# Patient Record
Sex: FEMALE | Race: WHITE | Employment: UNEMPLOYED | URBAN - METROPOLITAN AREA
[De-identification: names, ages, dates, MRNs, and addresses within clinical notes are randomized per-mention and may not be internally consistent; named-entity substitution may affect disease eponyms.]

---

## 2021-10-15 ENCOUNTER — HOSPITAL ENCOUNTER (INPATIENT)
Facility: HOSPITAL | Age: 61
LOS: 2 days | Discharge: HOME | DRG: 637 | End: 2021-10-18
Attending: EMERGENCY MEDICINE | Admitting: HOSPITALIST

## 2021-10-15 DIAGNOSIS — R73.9 HYPERGLYCEMIA: Primary | ICD-10-CM

## 2021-10-15 DIAGNOSIS — E11.10 DIABETIC KETOACIDOSIS WITHOUT COMA ASSOCIATED WITH TYPE 2 DIABETES MELLITUS (HCC): ICD-10-CM

## 2021-10-15 DIAGNOSIS — J18.9 COMMUNITY ACQUIRED PNEUMONIA OF RIGHT UPPER LOBE OF LUNG: ICD-10-CM

## 2021-10-16 ENCOUNTER — APPOINTMENT (OUTPATIENT)
Dept: GENERAL RADIOLOGY | Facility: HOSPITAL | Age: 61
DRG: 637 | End: 2021-10-16
Attending: EMERGENCY MEDICINE

## 2021-10-16 PROBLEM — R73.9 HYPERGLYCEMIA: Status: ACTIVE | Noted: 2021-10-16

## 2021-10-16 PROCEDURE — 99222 1ST HOSP IP/OBS MODERATE 55: CPT | Performed by: INTERNAL MEDICINE

## 2021-10-16 PROCEDURE — 71045 X-RAY EXAM CHEST 1 VIEW: CPT | Performed by: EMERGENCY MEDICINE

## 2021-10-16 PROCEDURE — 99223 1ST HOSP IP/OBS HIGH 75: CPT | Performed by: HOSPITALIST

## 2021-10-16 RX ORDER — ACETAMINOPHEN 325 MG/1
650 TABLET ORAL EVERY 6 HOURS PRN
Status: DISCONTINUED | OUTPATIENT
Start: 2021-10-16 | End: 2021-10-18

## 2021-10-16 RX ORDER — DEXTROSE MONOHYDRATE 25 G/50ML
50 INJECTION, SOLUTION INTRAVENOUS
Status: DISCONTINUED | OUTPATIENT
Start: 2021-10-16 | End: 2021-10-18

## 2021-10-16 RX ORDER — ONDANSETRON 2 MG/ML
4 INJECTION INTRAMUSCULAR; INTRAVENOUS EVERY 6 HOURS PRN
Status: DISCONTINUED | OUTPATIENT
Start: 2021-10-16 | End: 2021-10-18

## 2021-10-16 RX ORDER — DEXTROSE AND SODIUM CHLORIDE 5; .45 G/100ML; G/100ML
INJECTION, SOLUTION INTRAVENOUS CONTINUOUS
Status: DISCONTINUED | OUTPATIENT
Start: 2021-10-16 | End: 2021-10-16

## 2021-10-16 RX ORDER — HEPARIN SODIUM 5000 [USP'U]/ML
5000 INJECTION, SOLUTION INTRAVENOUS; SUBCUTANEOUS EVERY 12 HOURS SCHEDULED
Status: DISCONTINUED | OUTPATIENT
Start: 2021-10-16 | End: 2021-10-18

## 2021-10-16 RX ORDER — ZOLPIDEM TARTRATE 5 MG/1
5 TABLET ORAL NIGHTLY PRN
Status: DISCONTINUED | OUTPATIENT
Start: 2021-10-16 | End: 2021-10-18

## 2021-10-16 RX ORDER — HYDRALAZINE HYDROCHLORIDE 20 MG/ML
10 INJECTION INTRAMUSCULAR; INTRAVENOUS EVERY 4 HOURS PRN
Status: DISCONTINUED | OUTPATIENT
Start: 2021-10-16 | End: 2021-10-18

## 2021-10-16 RX ORDER — PANTOPRAZOLE SODIUM 40 MG/1
40 TABLET, DELAYED RELEASE ORAL
Status: DISCONTINUED | OUTPATIENT
Start: 2021-10-16 | End: 2021-10-18

## 2021-10-16 NOTE — PROGRESS NOTES
Patient is a post midnight follow up    Diabetic ketoacidosis  Off insulin drip  Can transfer to floor   Blood sugars stable  On ADA diet      Febrile illness  X-ray suggestive of pneumonia, blood cultures drawn.   Patient started on Rocephin and Zithromax

## 2021-10-16 NOTE — DIABETES ED
[facility]    Diabetes Education  Note    Ilya Smith Patient Status:  Inpatient   1960 MRN [id number]  Location [facility] 2W/SW Attending Pietro Zapien MD  Hosp Day # 0 PCP None Pcp      Labs:    HgbA1C (%) provided.       Recommendations:  Patient to self-administer all insulin doses with RN supervision   Consult Case Management/Social Work for assistance with medication resources for discharge  Attend outpatient diabetes education          Rivas Ramirez RN

## 2021-10-16 NOTE — PLAN OF CARE
Problem: Patient Centered Care  Goal: Patient preferences are identified and integrated in the patient's plan of care  Description: Interventions:  - What would you like us to know as we care for you?  Patient is from home with family  - Provide timely, c falls.  - [place] fall precautions as indicated by assessment.  - Educate pt/family on patient safety including physical limitations  - Instruct pt to call for assistance with activity based on assessment  - Modify environment to reduce risk of injury  - Progressing   Patient on insulin drip, blood sugars trending down. Patient reports fevers/vomiting at home. Blood cultures pending and IV antibiotics ordered. Patient speaks [place]ese but does speak some [place].  Patient's daughter at bedside to help transla

## 2021-10-16 NOTE — ED QUICK NOTES
Orders for admission, patient is aware of plan and ready to go upstairs. Any questions, please call ED RN aylin  at extension [phone]. Type of COVID test sent: Rapid  Neg   COVID Suspicion level: Low    Titratable drug(s) infusing: Insulin drip at 7u per hR.

## 2021-10-16 NOTE — ED INITIAL ASSESSMENT (HPI)
Pt to ED with c/o fever , chills, intermittent dizziness, and hyperglycemia x5 days. Negative COVID test on 10-. Pt states her glucose levels have been in the 300's for about a month.

## 2021-10-16 NOTE — CONSULTS
[facility] - [facility]    Report of Consultation    Carlos Dsouza Patient Status:  Inpatient    1960 MRN [id number]   Location [facility] 2W/SW Attending Xiomara Martin MD   Hosp Day # 0 PCP None Pcp     Date of Admission: (DEX4) oral liquid 30 g, 30 g, Oral, Q15 Min PRN **OR** glucose-vitamin C (DEX-4) chewable tab 8 tablet, 8 tablet, Oral, Q15 Min PRN  •  Insulin Regular Human (NOVOLIN R) 100 Units in sodium chloride 0.9% 100 mL infusion, 1-28 Units/hr, Intravenous, Contin drip  - Start Levemir 60 units subcutaneous daily  - Start Novolog 12 units subcutaneous TID with meals  - Medium dose CF  - Hypoglycemia protocol  - Discussed discharge plan for Relion 70/30 40 units subcutaneous BID    Jonatan follow    Nazario Jean MD  10/1

## 2021-10-16 NOTE — H&P
[address] [facility] Patient Status:  Emergency    1960 MRN [id number]   Location [facility] Attending Andre Evans MD   Hosp Day # 0 PCP None Pcp     Date:  10/16 124/74    General:  Alert and oriented. Diffuse skin problem:  None. Eye:  Pupils are equal, round and reactive to light, extraocular movements are intact, Normal conjunctiva.   HENT:  Normocephalic, oral mucosa is moist.  Head:  Normocephalic, atraumatic previously IV fluids have been initiated we will repeat labs in a.m.     Prophylaxis  Subcutaneous heparin    CODE STATUS  Full    Primary care physician  None Pcp    Disposition  Clinical course will dictate outcome      Alycia Aguilar MD  10/16/2021  1:0

## 2021-10-16 NOTE — ED PROVIDER NOTES
Patient Seen in: [facility] AND [facility] Emergency Department      History   Patient presents with:  Hyperglycemia  Fever    Stated Complaint: hyperglycemia     Subjective:   HPI  65-year-old female with history of diabetes, here with complaints of fatigue, di Temp 98 °F (36.7 °C)   Resp 18   Ht 154.9 cm (5' 1\")   Wt 58.4 kg   SpO2 100%   BMI 24.33 kg/m²         Physical Exam  Vitals and nursing note reviewed. HENT:      Head: Normocephalic.       Mouth/Throat:      Mouth: Mucous membranes are moist.   Eyes: American 76 >=60    GFR, -American 88 >=60   Acetone    Collection Time: 10/16/21 12:05 AM   Result Value Ref Range    Acetone Moderate (A) Negative   Venous Blood Gas    Collection Time: 10/16/21 12:05 AM   Result Value Ref Range    Venous Sample S Urine Negative Negative    WBC Urine 1-5 0 - 5 /HPF    RBC Urine 0-2 0 - 2 /HPF    Bacteria Urine None Seen None Seen /HPF   Rapid SARS-CoV-2 by PCR    Collection Time: 10/16/21 12:59 AM    Specimen: Nares;  Other   Result Value Ref Range    Rapid SARS-CoV- Clinical Impression:  Hyperglycemia  (primary encounter diagnosis)  Community acquired pneumonia of right upper lobe of lung  Diabetic ketoacidosis without coma associated with type 2 diabetes mellitus ([facility]     Disposition:  Admit  10/16/2021  2:22 am

## 2021-10-16 NOTE — CM/SW NOTE
MDO Finances    SW spoke with patient daughter, Loren Ledesma as patient was eating her breakfast at the time. Loren Ledesma states that she and patient live together in a home (address correct on facesheet).  Patient is typically independent with ADL's, daughter assists f

## 2021-10-17 PROCEDURE — 99233 SBSQ HOSP IP/OBS HIGH 50: CPT | Performed by: HOSPITALIST

## 2021-10-17 RX ORDER — POTASSIUM CHLORIDE 20 MEQ/1
60 TABLET, EXTENDED RELEASE ORAL DAILY
Status: DISCONTINUED | OUTPATIENT
Start: 2021-10-17 | End: 2021-10-18

## 2021-10-17 NOTE — PROGRESS NOTES
Patient transferred to room 562. Report given to ST. TEDDY JULIEN RN. Skin check performed by this RN and Meagan Stokes RN. Skin assessment is as follows:      Skin intact. No wounds noted. Karlie Grier accompanied patient to room 562.  This RN will remain available

## 2021-10-17 NOTE — PROGRESS NOTES
Endocrine Note    Reviewed BG levels from the past 24 hours which are significantly improved. Decrease Levemir to 50 units subcutaneous daily and continue Novolog 12 units subcutaneous TID with meals. Continue current CF.      Tentative Discharge Plan whe

## 2021-10-17 NOTE — PLAN OF CARE
Problem: Patient Centered Care  Goal: Patient preferences are identified and integrated in the patient's plan of care  Description: Interventions:  - What would you like us to know as we care for you?   - Provide timely, complete, and accurate informatio injury  Description: INTERVENTIONS:  - Assess pt frequently for physical needs  - Identify cognitive and physical deficits and behaviors that affect risk of falls.   - [place] fall precautions as indicated by assessment.  - Educate pt/family on patient sa (call light)  - Provide an  as needed  - Communicate barriers and strategies to overcome with those who interact with patient  Outcome: Progressing     Patient was a transfer from CCU. Oriented to unit.  Daughter at bedside, helps with translatio

## 2021-10-17 NOTE — PROGRESS NOTES
[facility] - [facility]    Progress Note    Bucky Lat Patient Status:  Inpatient    1960 MRN [id number]   Location [facility] 5SW/SE Attending Edyta Aranda MD   Hosp Day # 1 PCP None Pcp       Subjective:   Linda Runner 10/16/2021 at 7:18 AM          EKG    Result Date: 10/15/2021  ECG Report  Interpretation  --------------------------     • potassium chloride  60 mEq Oral Daily   • insulin detemir  50 Units Subcutaneous Noon   • Heparin Sodium (Porcine)  5,000 Units Subc

## 2021-10-17 NOTE — PLAN OF CARE
Problem: Patient Centered Care  Goal: Patient preferences are identified and integrated in the patient's plan of care  Description: Interventions:  - What would you like us to know as we care for you?   - Provide timely, complete, and accurate informatio PLANNING  Goal: Discharge to home or other facility with appropriate resources  Description: INTERVENTIONS:  - Identify barriers to discharge w/pt and caregiver  - Include patient/family/discharge partner in discharge planning  - Arrange for needed dischar

## 2021-10-18 VITALS
HEIGHT: 61 IN | BODY MASS INDEX: 24.3 KG/M2 | HEART RATE: 87 BPM | TEMPERATURE: 98 F | RESPIRATION RATE: 16 BRPM | WEIGHT: 128.69 LBS | DIASTOLIC BLOOD PRESSURE: 62 MMHG | OXYGEN SATURATION: 97 % | SYSTOLIC BLOOD PRESSURE: 111 MMHG

## 2021-10-18 PROCEDURE — 99239 HOSP IP/OBS DSCHRG MGMT >30: CPT | Performed by: HOSPITALIST

## 2021-10-18 PROCEDURE — 99232 SBSQ HOSP IP/OBS MODERATE 35: CPT | Performed by: INTERNAL MEDICINE

## 2021-10-18 RX ORDER — AZITHROMYCIN 250 MG/1
250 TABLET, FILM COATED ORAL DAILY
Qty: 3 TABLET | Refills: 0 | Status: SHIPPED | OUTPATIENT
Start: 2021-10-18

## 2021-10-18 NOTE — PLAN OF CARE
Patient A&Ox'4. Patient has safety precautions in place bed in the lowest position, bed alarm on, and call light within reach. Continue to monitor patient with frequent nursing rounds.   Problem: Patient Centered Care  Goal: Patient preferences are identifi period  Description: INTERVENTIONS  - Monitor WBC  - Administer growth factors as ordered  - Implement neutropenic guidelines  Outcome: Progressing     Problem: SAFETY ADULT - FALL  Goal: Free from fall injury  Description: INTERVENTIONS:  - Assess pt freq visual cues when possible  - Listen attentively, be patient, do not interrupt  - Minimize distractions  - Allow time for understanding and response  - Establish method for patient to ask for assistance (call light)  - Provide an  as needed  - Co

## 2021-10-18 NOTE — PLAN OF CARE
Problem: Patient Centered Care  Goal: Patient preferences are identified and integrated in the patient's plan of care  Description: Interventions:  - What would you like us to know as we care for you?   - Provide timely, complete, and accurate informatio injury  Description: INTERVENTIONS:  - Assess pt frequently for physical needs  - Identify cognitive and physical deficits and behaviors that affect risk of falls.   - [place] fall precautions as indicated by assessment.  - Educate pt/family on patient sa (call light)  - Provide an  as needed  - Communicate barriers and strategies to overcome with those who interact with patient  Outcome: Progressing     No c/o of pain. Daughter at bedside.  Call light within reach, bed in lowest position, alarms

## 2021-10-18 NOTE — DISCHARGE SUMMARY
[facility] [place] [facility] - [facility]    Discharge Summary    Cande Bravo Patient Status:  Inpatient    1960 MRN [id number]   Location [facility] 5SW/SE Attending Aimee Sorto MD   [facility] Day # 2 PCP None Pcp     Date of Admission: 10/15/2 with Dr. Robbin Machado in 1-2 weeks      Hypokalemia  - much better  -     Acute PNA  - continue IV ceftriaxone 1 g daily and azithromycin  - home on azithromycin for 3 more days            Quality:  · DVT Prophylaxis: heparin  · CODE status: Full      Com[facility]atio [address] [facility]  [phone]                         Follow up Labs and imaging: Other Discharge Instructions:       [address] [facility]  [address], [address]  Phone  [phone]  Fax

## 2021-10-18 NOTE — PROGRESS NOTES
[facility]    Progress Note    Davila Seat Patient Status:  Inpatient    1960 MRN [id number]   Location [facility] 5SW/SE Attending Kaiden Ramos MD   Hosp Day # 2 PCP None Pcp     Subjective:  She is feeling

## 2021-10-18 NOTE — PROGRESS NOTES
To whom it may concern,    Carol Johnson has been under my care from 10/15/2021 to 10/18/2021. She will be discharged today. Please excuse her from work at this time. She can return to work this Thursday 10/21.         Jaqueline Canseco MD

## 2021-10-18 NOTE — DIABETES ED
[facility]OS[facility] - [facility]    Diabetes Education  Note    Yesi Ordoñez Patient Status:  Inpatient   1960 MRN [id number]  Location [facility] 5SW/SE Attending Brady Reis MD  Hosp Day # 2 PCP None Pcp      Labs:    HgbA1C (%) provided  · Importance of good BG control to prevent short and long term complications  · Importance of close follow up with PCP and medical team    Patient verbalized understanding, was receptive to information provided.       Recommendations:  Patient to

## 2021-10-18 NOTE — PROGRESS NOTES
Discharge RN Summary: Patient has discharge order in. Patient to discharge home with family. IV removed by this RN x. Understands to follow up with PCP in 1 week. Patient understands to  meds with printed scripts.  Pt states she understands how to g

## 2021-10-19 ENCOUNTER — PATIENT OUTREACH (OUTPATIENT)
Dept: CASE MANAGEMENT | Age: 61
End: 2021-10-19

## 2021-10-19 NOTE — PROGRESS NOTES
1st attempt DM apt request & DM F/U questions    Dr Yony Schaffer  [address] [facility]  [phone]    Unable to contact pt daughter, Sukhdev Arellano (generic vm); will try again

## 2021-10-20 NOTE — PROGRESS NOTES
2nd attempt DM apt request & DM F/U questions     Dr Maribel Hernández  [address] [facility]  [phone]     Unable to contact pt daughter, Duyen Carpenter (generic vm); will try again

## 2021-10-21 NOTE — PROGRESS NOTES
3rd attempt DM apt request & DM F/U questions     Dr Maribel Hernández  [address] [facility]  [phone]     Unable to contact pt daughter, Duyen Carpenter (generic vm) after multiple attempts w/no answers  Closing encounter

## 2025-05-09 ENCOUNTER — HOSPITAL ENCOUNTER (INPATIENT)
Facility: HOSPITAL | Age: 65
LOS: 4 days | Discharge: HOME | End: 2025-05-13
Attending: EMERGENCY MEDICINE | Admitting: INTERNAL MEDICINE
Payer: MEDICAID

## 2025-05-09 ENCOUNTER — APPOINTMENT (OUTPATIENT)
Dept: CT IMAGING | Facility: HOSPITAL | Age: 65
End: 2025-05-09
Attending: EMERGENCY MEDICINE
Payer: MEDICAID

## 2025-05-09 DIAGNOSIS — K37 APPENDICITIS: ICD-10-CM

## 2025-05-09 DIAGNOSIS — K35.30 ACUTE APPENDICITIS WITH LOCALIZED PERITONITIS WITHOUT GANGRENE, UNSPECIFIED WHETHER ABSCESS PRESENT, UNSPECIFIED WHETHER PERFORATION PRESENT: Primary | ICD-10-CM

## 2025-05-09 PROBLEM — D72.829 LEUKOCYTOSIS: Status: ACTIVE | Noted: 2025-05-09

## 2025-05-09 LAB
ALBUMIN SERPL-MCNC: 4.5 G/DL (ref 3.2–4.8)
ALBUMIN/GLOB SERPL: 1.5 {RATIO} (ref 1–2)
ALP LIVER SERPL-CCNC: 99 U/L (ref 50–130)
ALT SERPL-CCNC: 18 U/L (ref 10–49)
ANION GAP SERPL CALC-SCNC: 9 MMOL/L (ref 0–18)
AST SERPL-CCNC: 22 U/L (ref ?–34)
BASOPHILS # BLD AUTO: 0.06 X10(3) UL (ref 0–0.2)
BASOPHILS NFR BLD AUTO: 0.3 %
BILIRUB SERPL-MCNC: 0.8 MG/DL (ref 0.2–1.1)
BILIRUB UR QL: NEGATIVE
BUN BLD-MCNC: 13 MG/DL (ref 9–23)
BUN/CREAT SERPL: 17.3 (ref 10–20)
CALCIUM BLD-MCNC: 9.3 MG/DL (ref 8.7–10.4)
CHLORIDE SERPL-SCNC: 101 MMOL/L (ref 98–112)
CLARITY UR: CLEAR
CO2 SERPL-SCNC: 26 MMOL/L (ref 21–32)
COLOR UR: YELLOW
CREAT BLD-MCNC: 0.75 MG/DL (ref 0.55–1.02)
DEPRECATED RDW RBC AUTO: 41 FL (ref 35.1–46.3)
EGFRCR SERPLBLD CKD-EPI 2021: 90 ML/MIN/1.73M2 (ref 60–?)
EOSINOPHIL # BLD AUTO: 0.02 X10(3) UL (ref 0–0.7)
EOSINOPHIL NFR BLD AUTO: 0.1 %
ERYTHROCYTE [DISTWIDTH] IN BLOOD BY AUTOMATED COUNT: 12.5 % (ref 11–15)
GLOBULIN PLAS-MCNC: 3 G/DL (ref 2–3.5)
GLUCOSE BLD-MCNC: 143 MG/DL (ref 70–99)
GLUCOSE UR-MCNC: NORMAL MG/DL
HCT VFR BLD AUTO: 37.5 % (ref 35–48)
HGB BLD-MCNC: 12.3 G/DL (ref 12–16)
HGB UR QL STRIP.AUTO: NEGATIVE
IMM GRANULOCYTES # BLD AUTO: 0.31 X10(3) UL (ref 0–1)
IMM GRANULOCYTES NFR BLD: 1.6 %
LACTATE SERPL-SCNC: 1 MMOL/L (ref 0.5–2)
LEUKOCYTE ESTERASE UR QL STRIP.AUTO: 25
LIPASE SERPL-CCNC: 29 U/L (ref 12–53)
LYMPHOCYTES # BLD AUTO: 2.34 X10(3) UL (ref 1–4)
LYMPHOCYTES NFR BLD AUTO: 12.2 %
MCH RBC QN AUTO: 29.2 PG (ref 26–34)
MCHC RBC AUTO-ENTMCNC: 32.8 G/DL (ref 31–37)
MCV RBC AUTO: 89.1 FL (ref 80–100)
MONOCYTES # BLD AUTO: 1.07 X10(3) UL (ref 0.1–1)
MONOCYTES NFR BLD AUTO: 5.6 %
NEUTROPHILS # BLD AUTO: 15.39 X10 (3) UL (ref 1.5–7.7)
NEUTROPHILS # BLD AUTO: 15.39 X10(3) UL (ref 1.5–7.7)
NEUTROPHILS NFR BLD AUTO: 80.2 %
NITRITE UR QL STRIP.AUTO: NEGATIVE
OSMOLALITY SERPL CALC.SUM OF ELEC: 285 MOSM/KG (ref 275–295)
PH UR: 6 [PH] (ref 5–8)
PLATELET # BLD AUTO: 244 10(3)UL (ref 150–450)
POTASSIUM SERPL-SCNC: 3.6 MMOL/L (ref 3.5–5.1)
PROT SERPL-MCNC: 7.5 G/DL (ref 5.7–8.2)
PROT UR-MCNC: 20 MG/DL
RBC # BLD AUTO: 4.21 X10(6)UL (ref 3.8–5.3)
SODIUM SERPL-SCNC: 136 MMOL/L (ref 136–145)
SP GR UR STRIP: 1.02 (ref 1–1.03)
UROBILINOGEN UR STRIP-ACNC: 4
WBC # BLD AUTO: 19.2 X10(3) UL (ref 4–11)

## 2025-05-09 PROCEDURE — 87086 URINE CULTURE/COLONY COUNT: CPT | Performed by: EMERGENCY MEDICINE

## 2025-05-09 PROCEDURE — 81001 URINALYSIS AUTO W/SCOPE: CPT | Performed by: EMERGENCY MEDICINE

## 2025-05-09 PROCEDURE — 74177 CT ABD & PELVIS W/CONTRAST: CPT | Performed by: EMERGENCY MEDICINE

## 2025-05-09 PROCEDURE — 83605 ASSAY OF LACTIC ACID: CPT | Performed by: EMERGENCY MEDICINE

## 2025-05-09 PROCEDURE — 99285 EMERGENCY DEPT VISIT HI MDM: CPT

## 2025-05-09 PROCEDURE — 96376 TX/PRO/DX INJ SAME DRUG ADON: CPT

## 2025-05-09 PROCEDURE — 96365 THER/PROPH/DIAG IV INF INIT: CPT

## 2025-05-09 PROCEDURE — 83690 ASSAY OF LIPASE: CPT | Performed by: EMERGENCY MEDICINE

## 2025-05-09 PROCEDURE — 85025 COMPLETE CBC W/AUTO DIFF WBC: CPT | Performed by: EMERGENCY MEDICINE

## 2025-05-09 PROCEDURE — 36415 COLL VENOUS BLD VENIPUNCTURE: CPT

## 2025-05-09 PROCEDURE — 96375 TX/PRO/DX INJ NEW DRUG ADDON: CPT

## 2025-05-09 PROCEDURE — 87040 BLOOD CULTURE FOR BACTERIA: CPT | Performed by: EMERGENCY MEDICINE

## 2025-05-09 PROCEDURE — 80053 COMPREHEN METABOLIC PANEL: CPT | Performed by: EMERGENCY MEDICINE

## 2025-05-09 PROCEDURE — 96361 HYDRATE IV INFUSION ADD-ON: CPT

## 2025-05-09 PROCEDURE — 83036 HEMOGLOBIN GLYCOSYLATED A1C: CPT | Performed by: HOSPITALIST

## 2025-05-09 RX ORDER — KETOROLAC TROMETHAMINE 30 MG/ML
30 INJECTION, SOLUTION INTRAMUSCULAR; INTRAVENOUS EVERY 6 HOURS PRN
Status: ACTIVE | OUTPATIENT
Start: 2025-05-09 | End: 2025-05-11

## 2025-05-09 RX ORDER — DEXTROSE MONOHYDRATE 25 G/50ML
50 INJECTION, SOLUTION INTRAVENOUS
Status: DISCONTINUED | OUTPATIENT
Start: 2025-05-09 | End: 2025-05-13

## 2025-05-09 RX ORDER — MORPHINE SULFATE 4 MG/ML
4 INJECTION, SOLUTION INTRAMUSCULAR; INTRAVENOUS EVERY 2 HOUR PRN
Status: DISCONTINUED | OUTPATIENT
Start: 2025-05-09 | End: 2025-05-10

## 2025-05-09 RX ORDER — ACETAMINOPHEN 500 MG
1000 TABLET ORAL ONCE
Status: COMPLETED | OUTPATIENT
Start: 2025-05-09 | End: 2025-05-09

## 2025-05-09 RX ORDER — INSULIN DEGLUDEC 100 U/ML
10 INJECTION, SOLUTION SUBCUTANEOUS DAILY
Status: DISCONTINUED | OUTPATIENT
Start: 2025-05-10 | End: 2025-05-10

## 2025-05-09 RX ORDER — MORPHINE SULFATE 2 MG/ML
2 INJECTION, SOLUTION INTRAMUSCULAR; INTRAVENOUS EVERY 2 HOUR PRN
Status: DISCONTINUED | OUTPATIENT
Start: 2025-05-09 | End: 2025-05-10

## 2025-05-09 RX ORDER — NICOTINE POLACRILEX 4 MG
15 LOZENGE BUCCAL
Status: DISCONTINUED | OUTPATIENT
Start: 2025-05-09 | End: 2025-05-13

## 2025-05-09 RX ORDER — NICOTINE POLACRILEX 4 MG
30 LOZENGE BUCCAL
Status: DISCONTINUED | OUTPATIENT
Start: 2025-05-09 | End: 2025-05-13

## 2025-05-09 RX ORDER — SODIUM CHLORIDE 9 MG/ML
1000 INJECTION, SOLUTION INTRAVENOUS ONCE
Status: COMPLETED | OUTPATIENT
Start: 2025-05-09 | End: 2025-05-09

## 2025-05-09 RX ORDER — SODIUM CHLORIDE 9 MG/ML
INJECTION, SOLUTION INTRAVENOUS CONTINUOUS
Status: DISCONTINUED | OUTPATIENT
Start: 2025-05-09 | End: 2025-05-10

## 2025-05-09 RX ORDER — METOCLOPRAMIDE HYDROCHLORIDE 5 MG/ML
10 INJECTION INTRAMUSCULAR; INTRAVENOUS EVERY 6 HOURS PRN
Status: DISCONTINUED | OUTPATIENT
Start: 2025-05-09 | End: 2025-05-13

## 2025-05-09 RX ORDER — MORPHINE SULFATE 2 MG/ML
2 INJECTION, SOLUTION INTRAMUSCULAR; INTRAVENOUS ONCE
Status: COMPLETED | OUTPATIENT
Start: 2025-05-09 | End: 2025-05-09

## 2025-05-09 RX ORDER — ONDANSETRON 2 MG/ML
4 INJECTION INTRAMUSCULAR; INTRAVENOUS EVERY 6 HOURS PRN
Status: DISCONTINUED | OUTPATIENT
Start: 2025-05-09 | End: 2025-05-13

## 2025-05-09 RX ORDER — FAMOTIDINE 10 MG/ML
20 INJECTION, SOLUTION INTRAVENOUS 2 TIMES DAILY
Status: DISCONTINUED | OUTPATIENT
Start: 2025-05-09 | End: 2025-05-13

## 2025-05-10 ENCOUNTER — ANESTHESIA (OUTPATIENT)
Dept: SURGERY | Facility: HOSPITAL | Age: 65
End: 2025-05-10
Payer: MEDICAID

## 2025-05-10 ENCOUNTER — ANESTHESIA EVENT (OUTPATIENT)
Dept: SURGERY | Facility: HOSPITAL | Age: 65
End: 2025-05-10
Payer: MEDICAID

## 2025-05-10 LAB
ANTIBODY SCREEN: NEGATIVE
EST. AVERAGE GLUCOSE BLD GHB EST-MCNC: 174 MG/DL (ref 68–126)
GLUCOSE BLDC GLUCOMTR-MCNC: 153 MG/DL (ref 70–99)
GLUCOSE BLDC GLUCOMTR-MCNC: 163 MG/DL (ref 70–99)
GLUCOSE BLDC GLUCOMTR-MCNC: 168 MG/DL (ref 70–99)
GLUCOSE BLDC GLUCOMTR-MCNC: 201 MG/DL (ref 70–99)
GLUCOSE BLDC GLUCOMTR-MCNC: 202 MG/DL (ref 70–99)
GLUCOSE BLDC GLUCOMTR-MCNC: 211 MG/DL (ref 70–99)
GLUCOSE BLDC GLUCOMTR-MCNC: 278 MG/DL (ref 70–99)
HBA1C MFR BLD: 7.7 % (ref ?–5.7)
RH BLOOD TYPE: POSITIVE
RH BLOOD TYPE: POSITIVE

## 2025-05-10 PROCEDURE — 88304 TISSUE EXAM BY PATHOLOGIST: CPT | Performed by: STUDENT IN AN ORGANIZED HEALTH CARE EDUCATION/TRAINING PROGRAM

## 2025-05-10 PROCEDURE — 82962 GLUCOSE BLOOD TEST: CPT

## 2025-05-10 PROCEDURE — 86901 BLOOD TYPING SEROLOGIC RH(D): CPT | Performed by: HOSPITALIST

## 2025-05-10 PROCEDURE — 86850 RBC ANTIBODY SCREEN: CPT | Performed by: HOSPITALIST

## 2025-05-10 PROCEDURE — 0DTJ4ZZ RESECTION OF APPENDIX, PERCUTANEOUS ENDOSCOPIC APPROACH: ICD-10-PCS | Performed by: STUDENT IN AN ORGANIZED HEALTH CARE EDUCATION/TRAINING PROGRAM

## 2025-05-10 PROCEDURE — 86900 BLOOD TYPING SEROLOGIC ABO: CPT | Performed by: HOSPITALIST

## 2025-05-10 RX ORDER — HYDROMORPHONE HYDROCHLORIDE 1 MG/ML
0.5 INJECTION, SOLUTION INTRAMUSCULAR; INTRAVENOUS; SUBCUTANEOUS EVERY 2 HOUR PRN
Status: DISCONTINUED | OUTPATIENT
Start: 2025-05-10 | End: 2025-05-13

## 2025-05-10 RX ORDER — HYDROMORPHONE HYDROCHLORIDE 1 MG/ML
0.6 INJECTION, SOLUTION INTRAMUSCULAR; INTRAVENOUS; SUBCUTANEOUS EVERY 5 MIN PRN
Status: DISCONTINUED | OUTPATIENT
Start: 2025-05-10 | End: 2025-05-10 | Stop reason: HOSPADM

## 2025-05-10 RX ORDER — NALOXONE HYDROCHLORIDE 0.4 MG/ML
0.08 INJECTION, SOLUTION INTRAMUSCULAR; INTRAVENOUS; SUBCUTANEOUS AS NEEDED
Status: DISCONTINUED | OUTPATIENT
Start: 2025-05-10 | End: 2025-05-10 | Stop reason: HOSPADM

## 2025-05-10 RX ORDER — VALACYCLOVIR HYDROCHLORIDE 500 MG/1
500 TABLET, FILM COATED ORAL DAILY
COMMUNITY
Start: 2025-04-30

## 2025-05-10 RX ORDER — ENOXAPARIN SODIUM 100 MG/ML
40 INJECTION SUBCUTANEOUS DAILY
Status: DISCONTINUED | OUTPATIENT
Start: 2025-05-11 | End: 2025-05-13

## 2025-05-10 RX ORDER — PREGABALIN 50 MG/1
100 CAPSULE ORAL 3 TIMES DAILY
Status: DISCONTINUED | OUTPATIENT
Start: 2025-05-10 | End: 2025-05-13

## 2025-05-10 RX ORDER — VALACYCLOVIR HYDROCHLORIDE 500 MG/1
500 TABLET, FILM COATED ORAL DAILY
Status: DISCONTINUED | OUTPATIENT
Start: 2025-05-10 | End: 2025-05-13

## 2025-05-10 RX ORDER — GLIPIZIDE 5 MG/1
5 TABLET ORAL EVERY MORNING
COMMUNITY
Start: 2025-05-04

## 2025-05-10 RX ORDER — INSULIN DEGLUDEC 100 U/ML
12 INJECTION, SOLUTION SUBCUTANEOUS DAILY
Status: DISCONTINUED | OUTPATIENT
Start: 2025-05-11 | End: 2025-05-13

## 2025-05-10 RX ORDER — SODIUM CHLORIDE, SODIUM LACTATE, POTASSIUM CHLORIDE, CALCIUM CHLORIDE 600; 310; 30; 20 MG/100ML; MG/100ML; MG/100ML; MG/100ML
INJECTION, SOLUTION INTRAVENOUS CONTINUOUS PRN
Status: DISCONTINUED | OUTPATIENT
Start: 2025-05-10 | End: 2025-05-10 | Stop reason: SURG

## 2025-05-10 RX ORDER — PHENYLEPHRINE HCL 10 MG/ML
VIAL (ML) INJECTION AS NEEDED
Status: DISCONTINUED | OUTPATIENT
Start: 2025-05-10 | End: 2025-05-10 | Stop reason: SURG

## 2025-05-10 RX ORDER — MORPHINE SULFATE 4 MG/ML
4 INJECTION, SOLUTION INTRAMUSCULAR; INTRAVENOUS EVERY 10 MIN PRN
Status: DISCONTINUED | OUTPATIENT
Start: 2025-05-10 | End: 2025-05-10 | Stop reason: HOSPADM

## 2025-05-10 RX ORDER — OXYCODONE AND ACETAMINOPHEN 5; 325 MG/1; MG/1
1-2 TABLET ORAL EVERY 4 HOURS PRN
Qty: 5 TABLET | Refills: 0 | Status: SHIPPED | OUTPATIENT
Start: 2025-05-10 | End: 2025-05-15

## 2025-05-10 RX ORDER — MORPHINE SULFATE 4 MG/ML
2 INJECTION, SOLUTION INTRAMUSCULAR; INTRAVENOUS EVERY 10 MIN PRN
Status: DISCONTINUED | OUTPATIENT
Start: 2025-05-10 | End: 2025-05-10 | Stop reason: HOSPADM

## 2025-05-10 RX ORDER — ACETAMINOPHEN 500 MG
1000 TABLET ORAL EVERY 8 HOURS
Status: DISCONTINUED | OUTPATIENT
Start: 2025-05-10 | End: 2025-05-13

## 2025-05-10 RX ORDER — ONDANSETRON 2 MG/ML
INJECTION INTRAMUSCULAR; INTRAVENOUS AS NEEDED
Status: DISCONTINUED | OUTPATIENT
Start: 2025-05-10 | End: 2025-05-10 | Stop reason: SURG

## 2025-05-10 RX ORDER — ROCURONIUM BROMIDE 10 MG/ML
INJECTION, SOLUTION INTRAVENOUS AS NEEDED
Status: DISCONTINUED | OUTPATIENT
Start: 2025-05-10 | End: 2025-05-10 | Stop reason: SURG

## 2025-05-10 RX ORDER — DEXTROSE MONOHYDRATE AND SODIUM CHLORIDE 5; .9 G/100ML; G/100ML
INJECTION, SOLUTION INTRAVENOUS CONTINUOUS
Status: DISCONTINUED | OUTPATIENT
Start: 2025-05-10 | End: 2025-05-10

## 2025-05-10 RX ORDER — HYDROMORPHONE HYDROCHLORIDE 1 MG/ML
0.2 INJECTION, SOLUTION INTRAMUSCULAR; INTRAVENOUS; SUBCUTANEOUS EVERY 5 MIN PRN
Status: DISCONTINUED | OUTPATIENT
Start: 2025-05-10 | End: 2025-05-10 | Stop reason: HOSPADM

## 2025-05-10 RX ORDER — OXYCODONE HYDROCHLORIDE 5 MG/1
5 TABLET ORAL EVERY 4 HOURS PRN
Status: DISCONTINUED | OUTPATIENT
Start: 2025-05-10 | End: 2025-05-13

## 2025-05-10 RX ORDER — PREGABALIN 100 MG/1
100 CAPSULE ORAL 3 TIMES DAILY
COMMUNITY

## 2025-05-10 RX ORDER — MORPHINE SULFATE 10 MG/ML
6 INJECTION, SOLUTION INTRAMUSCULAR; INTRAVENOUS EVERY 10 MIN PRN
Status: DISCONTINUED | OUTPATIENT
Start: 2025-05-10 | End: 2025-05-10 | Stop reason: HOSPADM

## 2025-05-10 RX ORDER — HYDROMORPHONE HYDROCHLORIDE 1 MG/ML
0.4 INJECTION, SOLUTION INTRAMUSCULAR; INTRAVENOUS; SUBCUTANEOUS EVERY 5 MIN PRN
Status: DISCONTINUED | OUTPATIENT
Start: 2025-05-10 | End: 2025-05-10 | Stop reason: HOSPADM

## 2025-05-10 RX ORDER — OMEGA-3 FATTY ACIDS/FISH OIL 300-1000MG
400 CAPSULE ORAL EVERY 6 HOURS PRN
Qty: 40 CAPSULE | Refills: 0 | Status: SHIPPED | OUTPATIENT
Start: 2025-05-10 | End: 2025-05-15

## 2025-05-10 RX ORDER — SODIUM CHLORIDE, SODIUM LACTATE, POTASSIUM CHLORIDE, CALCIUM CHLORIDE 600; 310; 30; 20 MG/100ML; MG/100ML; MG/100ML; MG/100ML
INJECTION, SOLUTION INTRAVENOUS CONTINUOUS
Status: DISCONTINUED | OUTPATIENT
Start: 2025-05-10 | End: 2025-05-10 | Stop reason: HOSPADM

## 2025-05-10 RX ORDER — BUPIVACAINE HYDROCHLORIDE AND EPINEPHRINE 2.5; 5 MG/ML; UG/ML
INJECTION, SOLUTION INFILTRATION; PERINEURAL AS NEEDED
Status: DISCONTINUED | OUTPATIENT
Start: 2025-05-10 | End: 2025-05-10 | Stop reason: HOSPADM

## 2025-05-10 RX ORDER — SODIUM CHLORIDE 9 MG/ML
INJECTION, SOLUTION INTRAVENOUS CONTINUOUS
Status: DISCONTINUED | OUTPATIENT
Start: 2025-05-10 | End: 2025-05-13

## 2025-05-10 RX ORDER — HYDROMORPHONE HYDROCHLORIDE 1 MG/ML
1 INJECTION, SOLUTION INTRAMUSCULAR; INTRAVENOUS; SUBCUTANEOUS EVERY 2 HOUR PRN
Status: DISCONTINUED | OUTPATIENT
Start: 2025-05-10 | End: 2025-05-13

## 2025-05-10 RX ORDER — LIDOCAINE HYDROCHLORIDE 10 MG/ML
INJECTION, SOLUTION EPIDURAL; INFILTRATION; INTRACAUDAL; PERINEURAL AS NEEDED
Status: DISCONTINUED | OUTPATIENT
Start: 2025-05-10 | End: 2025-05-10 | Stop reason: SURG

## 2025-05-10 RX ADMIN — PHENYLEPHRINE HCL 100 MCG: 10 MG/ML VIAL (ML) INJECTION at 10:03:00

## 2025-05-10 RX ADMIN — LIDOCAINE HYDROCHLORIDE 50 MG: 10 INJECTION, SOLUTION EPIDURAL; INFILTRATION; INTRACAUDAL; PERINEURAL at 09:48:00

## 2025-05-10 RX ADMIN — ROCURONIUM BROMIDE 10 MG: 10 INJECTION, SOLUTION INTRAVENOUS at 09:48:00

## 2025-05-10 RX ADMIN — ROCURONIUM BROMIDE 40 MG: 10 INJECTION, SOLUTION INTRAVENOUS at 09:58:00

## 2025-05-10 RX ADMIN — ONDANSETRON 4 MG: 2 INJECTION INTRAMUSCULAR; INTRAVENOUS at 09:48:00

## 2025-05-10 RX ADMIN — PHENYLEPHRINE HCL 100 MCG: 10 MG/ML VIAL (ML) INJECTION at 10:00:00

## 2025-05-10 RX ADMIN — SODIUM CHLORIDE, SODIUM LACTATE, POTASSIUM CHLORIDE, CALCIUM CHLORIDE: 600; 310; 30; 20 INJECTION, SOLUTION INTRAVENOUS at 09:45:00

## 2025-05-10 RX ADMIN — PHENYLEPHRINE HCL 100 MCG: 10 MG/ML VIAL (ML) INJECTION at 09:57:00

## 2025-05-10 NOTE — PLAN OF CARE
Domi is A&Ox4 on room air. POD0. Lap sites clean dry and intact. Has IVF infusing. On V abx. Tolerating clear liquid diet. Is belching. Ambulating standby assist. Plan is for home when cleared.  Problem: Patient Centered Care  Goal: Patient preferences are identified and integrated in the patient's plan of care  Description: Interventions:- What would you like us to know as we care for you? - Provide timely, complete, and accurate information to patient/family- Incorporate patient and family knowledge, values, beliefs, and cultural backgrounds into the planning and delivery of care- Encourage patient/family to participate in care and decision-making at the level they choose- Honor patient and family perspectives and choices  Outcome: Progressing     Problem: PAIN - ADULT  Goal: Verbalizes/displays adequate comfort level or patient's stated pain goal  Description: INTERVENTIONS:- Encourage pt to monitor pain and request assistance- Assess pain using appropriate pain scale- Administer analgesics based on type and severity of pain and evaluate response- Implement non-pharmacological measures as appropriate and evaluate response- Consider cultural and social influences on pain and pain management- Manage/alleviate anxiety- Utilize distraction and/or relaxation techniques- Monitor for opioid side effects- Notify MD/LIP if interventions unsuccessful or patient reports new pain- Anticipate increased pain with activity and pre-medicate as appropriate  Outcome: Progressing     Problem: RISK FOR INFECTION - ADULT  Goal: Absence of fever/infection during anticipated neutropenic period  Description: INTERVENTIONS- Monitor WBC- Administer growth factors as ordered- Implement neutropenic guidelines  Outcome: Progressing     Problem: SAFETY ADULT - FALL  Goal: Free from fall injury  Description: INTERVENTIONS:- Assess pt frequently for physical needs- Identify cognitive and physical deficits and behaviors that affect risk of  falls.- [place] fall precautions as indicated by assessment.- Educate pt/family on patient safety including physical limitations- Instruct pt to call for assistance with activity based on assessment- Modify environment to reduce risk of injury- Provide assistive devices as appropriate- Consider OT/PT consult to assist with strengthening/mobility- Encourage toileting schedule  Outcome: Progressing     Problem: DISCHARGE PLANNING  Goal: Discharge to home or other facility with appropriate resources  Description: INTERVENTIONS:- Identify barriers to discharge w/pt and caregiver- Include patient/family/discharge partner in discharge planning- Arrange for needed discharge resources and transportation as appropriate- Identify discharge learning needs (meds, wound care, etc)- Arrange for interpreters to assist at discharge as needed- Consider post-discharge preferences of patient/family/discharge partner- Complete POLST form as appropriate- Assess patient's ability to be responsible for managing their own health- Refer to Case Management Department for coordinating discharge planning if the patient needs post-hospital services based on physician/LIP order or complex needs related to functional status, cognitive ability or social support system  Outcome: Progressing

## 2025-05-10 NOTE — CONSULTS
[facility]  [facility] of [facility]    Report of Consultation    Domi Cordova Patient Status:  Inpatient    1960 MRN [id number]   Location [facility] PRE OP RECOVERY Attending Diana Lopez,*   Hosp Day # 1 PCP None Pcp     Date of Admission:  2025  Date of Consult:  2025  Reason for Consultation:   Acute appendicitis with localized peritonitis without gangrene, unspecified whether abscess present, unspecified whether perforation present    History of Present Illness:   Patient is a 64 year old female who was admitted to the hospital for Acute appendicitis with localized peritonitis without gangrene, unspecified whether abscess present, unspecified whether perforation present:    The patient was in the room with family members. She reports RLQ abdominal pain for two days without significant nausea or vomiting or changes in bowel habits. Leukocytosis 19.2, mild tachy, Tmax 102.8. CT abd/pel with dilated appendix, surrounding inflammation, appendicolith.          Past Medical History  Past Medical History[1]    Past Surgical History  Past Surgical History[2]    Family History  Family History[3]    Social History  Short Social Hx on File[4]        Current Medications:  Current Hospital Medications[5]  Prescriptions Prior to Admission[6]    Allergies  Allergies[7]    Review of Systems:   Review of Systems   All other systems reviewed and are negative.       Physical Exam:   Vital Signs:  Blood pressure 113/62, pulse 100, temperature 98.8 °F (37.1 °C), temperature source Oral, resp. rate 18, height 5' (1.524 m), weight 126 lb (57.2 kg), SpO2 97%, not currently breastfeeding.     Physical Exam  Abdominal:      General: There is distension.      Palpations: Abdomen is soft.      Tenderness: There is abdominal tenderness. There is no guarding or rebound.      Comments: TTP lower abdomen, RLQ          Results:     Laboratory Data:  Lab Results   Component Value Date     WBC 19.2 (H) 05/09/2025    HGB 12.3 05/09/2025    HCT 37.5 05/09/2025    .0 05/09/2025    CREATSERUM 0.75 05/09/2025    BUN 13 05/09/2025     05/09/2025    K 3.6 05/09/2025     05/09/2025    CO2 26.0 05/09/2025     (H) 05/09/2025    CA 9.3 05/09/2025    ALB 4.5 05/09/2025    ALKPHO 99 05/09/2025    TP 7.5 05/09/2025    AST 22 05/09/2025    ALT 18 05/09/2025    LIP 29 05/09/2025         Imaging:  CT ABDOMEN+PELVIS(CONTRAST ONLY)(CPT=74177)  Result Date: 5/9/2025  CONCLUSION:   1. Acute appendicitis with probable appendicolith at the base.  Extensive surrounding inflammatory changes are noted, possibly phlegmon or developing abscess.  However, there is no definite evidence of rupture.   2. Cholelithiasis with mild gallbladder wall thickening.  Consider further evaluation with right upper quadrant ultrasound when clinically able.  3. Additional chronic or incidental findings are described in the body of this report.    [facility]    Dictated by (CST): Shankar Christine MD on 5/09/2025 at 10:07 PM     Finalized by (CST): Shankar Christine MD on 5/09/2025 at 10:10 PM               Impression:     Domi Cordova is a 64 year old female with acute appendicitis with possible perforation, febrile and tachycardic. Recommend laparoscopic appendectomy, Risks, benefits, indications as well as expected post-op recovery were discussed with the patient who expresses understanding and willingness to proceed.  Continue NPO, mIVF, anticipate post-op course of antibiotics.     Thank you for allowing me to participate in the care of your patient.      Karey Dhillon MD  [facility] - General Surgery   [address] [place] [address]  p [phone]    5/10/2025         [1]   Past Medical History:   Diabetes (HCC)    DM (diabetes mellitus) (HCC)    Sciatica   [2] History reviewed. No pertinent surgical history.  [3]   Family History  Problem Relation Age of Onset    Diabetes Father      Heart Disorder Mother     Diabetes Sister    [4]   Social History  Socioeconomic History    Marital status: Single   Tobacco Use    Smoking status: Every Day     Current packs/day: 0.50     Types: Cigarettes    Smokeless tobacco: Never   Substance and Sexual Activity    Alcohol use: Never     Social Drivers of Health     Food Insecurity: No Food Insecurity (5/9/2025)    NCSS - Food Insecurity     Worried About Running Out of Food in the Last Year: No     Ran Out of Food in the Last Year: No   Transportation Needs: No Transportation Needs (5/9/2025)    NCSS - Transportation     Lack of Transportation: No   Housing Stability: Not At Risk (5/9/2025)    NCSS - Housing/Utilities     Has Housing: Yes     Worried About Losing Housing: No     Unable to Get Utilities: No   [5]   Current Facility-Administered Medications   Medication Dose Route Frequency    [Transfer Hold] HYDROmorphone (Dilaudid) 1 MG/ML injection 0.5 mg  0.5 mg Intravenous Q2H PRN    Or    [Transfer Hold] HYDROmorphone (Dilaudid) 1 MG/ML injection 1 mg  1 mg Intravenous Q2H PRN    dextrose 5%-sodium chloride 0.9% infusion   Intravenous Continuous    [Transfer Hold] ondansetron (Zofran) 4 MG/2ML injection 4 mg  4 mg Intravenous Q6H PRN    [Transfer Hold] morphINE PF 2 MG/ML injection 2 mg  2 mg Intravenous Q2H PRN    Or    [Transfer Hold] morphINE PF 4 MG/ML injection 4 mg  4 mg Intravenous Q2H PRN    [Transfer Hold] metoclopramide (Reglan) 5 mg/mL injection 10 mg  10 mg Intravenous Q6H PRN    [Transfer Hold] ketorolac (Toradol) 30 MG/ML injection 30 mg  30 mg Intravenous Q6H PRN    [Transfer Hold] famotidine (Pepcid) 20 mg/2mL injection 20 mg  20 mg Intravenous BID    piperacillin-tazobactam (Zosyn) 3.375 g in dextrose 5% 100 mL IVPB-ADDV  3.375 g Intravenous Q8H    [Transfer Hold] glucose (Dex4) 15 GM/59ML oral liquid 15 g  15 g Oral Q15 Min PRN    Or    [Transfer Hold] glucose (Glutose) 40% oral gel 15 g  15 g Oral Q15 Min PRN    Or    [Transfer Hold]  glucose-vitamin C (Dex-4) chewable tab 4 tablet  4 tablet Oral Q15 Min PRN    Or    [Transfer Hold] dextrose 50% injection 50 mL  50 mL Intravenous Q15 Min PRN    Or    [Transfer Hold] glucose (Dex4) 15 GM/59ML oral liquid 30 g  30 g Oral Q15 Min PRN    Or    [Transfer Hold] glucose (Glutose) 40% oral gel 30 g  30 g Oral Q15 Min PRN    Or    [Transfer Hold] glucose-vitamin C (Dex-4) chewable tab 8 tablet  8 tablet Oral Q15 Min PRN    [Transfer Hold] insulin degludec (Tresiba) 100 units/mL flextouch 10 Units  10 Units Subcutaneous Daily    [Transfer Hold] insulin regular human (Novolin R, Humulin R) 100 UNIT/ML injection 1-7 Units  1-7 Units Subcutaneous 4 times per day   [6]   Medications Prior to Admission   Medication Sig    glipiZIDE 5 MG Oral Tab Take 1 tablet (5 mg total) by mouth every morning.    metFORMIN 500 MG Oral Tab Take 1 tablet (500 mg total) by mouth 2 (two) times daily.    valACYclovir 500 MG Oral Tab Take 1 tablet (500 mg total) by mouth daily.    azithromycin 250 MG Oral Tab Take 1 tablet (250 mg total) by mouth daily.    Insulin NPH & Regular 70/30 (70-30) 100 UNIT/ML Subcutaneous Suspension Inject 30 Units into the skin 2 (two) times daily before meals.   [7] No Known Allergies

## 2025-05-10 NOTE — ANESTHESIA POSTPROCEDURE EVALUATION
Patient: Domi Cordova    Procedure Summary       Date: 05/10/25 Room / Location: [facility] MAIN OR  / [facility] MAIN OR    Anesthesia Start: 0945 Anesthesia Stop: 1046    Procedure: LAPAROSCOPIC APPENDECTOMY (Abdomen) Diagnosis:       Appendicitis      (Appendicitis [K37])    Surgeons: Karey Dhillon MD Anesthesiologist: Kim Carrasco MD    Anesthesia Type: general ASA Status: 3            Anesthesia Type: general    Vitals Value Taken Time   /76 05/10/25 10:56   Temp  05/10/25 11:04   Pulse 108 05/10/25 11:04   Resp 17 05/10/25 11:04   SpO2 95 % 05/10/25 11:04   Vitals shown include unfiled device data.    [facility] AN Post Evaluation:   Patient Evaluated in PACU  Patient Participation: complete - patient participated  Level of Consciousness: awake  Pain Management: adequate  Airway Patency:patent  Yes    Nausea/Vomiting: none  Cardiovascular Status: acceptable  Respiratory Status: acceptable  Postoperative Hydration acceptable      Kim Carrasco MD  5/10/2025 11:04 AM

## 2025-05-10 NOTE — DISCHARGE INSTRUCTIONS
INSTRUCCIONES POSTOPERATORIAS PARA CIRUGÍA LAPAROSCÓPICA/ROBOTIZADA  Muchas iris por permitirme participar en solis atención, es un verdadero privilegio. A continuación, encontrará las órdenes de cortney que le ayudarán cheryl solis recuperación. No dude en llamar a la oficina al [phone] si tiene alguna pregunta. Después de horas, el mismo número le permitirá comunicarse con el cirujano de chely.    o Llame a la oficina [phone] para programar solis sonia de seguimiento postoperatorio con bacilio Dhillon para dentro de 2 semanas, si aún no está programada.    o (Puede ser necesario que lo pieter antes de 2 semanas si tiene puntos y/o drenajes presentes)    o  Cambie los vendajes diariamente o con más frecuencia si es necesario.    o Mantenga las incisiones limpias con jabón y agua a diario.    o Cubra la(s) incisión(es) según sea necesario.    o Por favor, retire las tiras adhesivas (Steri-Strips) 5 días después de la cirugía. [place] incluye cualquier vendaje transparente y gasa colocada en el ombligo, si es aplicable.    o Si tiene pegamento para la piel, brad se caerá solo.    o  Puede colocar solomon bolsa de hielo sobre jumana incisiones de manera intermitente (15 minutos) cheryl las próximas 24-48 horas.    o  Reanude solis dieta habitual según lo tolere (se recomienda comenzar con líquidos).    o  Pautas generales para la actividad:    - Evite actividades extenuantes o levantar objetos que pesen más de 15 libras.     - Está stephan levantarse y caminar. También está stephan subir y bajar escaleras.    - Danay lo que le resulte cómodo: deténgase y descanse cuando se sienta cansado.    O Está stephan ducharse después de 24 horas.    o Tendrá medicamentos para el dolor prescritos. Tómelo según las indicaciones/necesidades.    o Algunas molestias, moretones leves e hinchazón no son inusuales; por favor llame a mi oficina si tiene un dolor severo, sangrado o fiebre cortney (superior a 101°F).    o Cheryl el procedimiento  robótico/laparoscópico que tuvo, se introduce gas en la cavidad abdominal. Puede sentir dolor abdominal, en los hombros o en las costillas cheryl unos días debido a esto.    o Reanude jumana medicamentos habituales (consulte la hoja de conciliación de medicamentos).    o NO conduzca cheryl un día y mientras esté tomando solis medicamento narcótico para el dolor.    O Observe los signos de infección:   - Calor excesivo o enrojecimiento brillante alrededor de jumana incisiones.   - Filtración de líquido sanguinolento o turbio de jumana incisiones.   - Fiebre superior a 100.5.   - Si experimenta estreñimiento:    o Aumente solis ingesta de agua.    o Aumente solis actividad; caminar es lo mejor.    o Un ablandador de heces de venta jeffery o un laxante suave pueden ser necesarios si no ha tenido solomon evacuación intestinal después de varios días.    Por favor, llame a la oficina al [phone] si tiene alguna pregunta y para programar solis sonia postoperatoria si es necesario. Peggy nuevamente por permitirme participar en solis atención, ¡y que se recupere pronto!        Karey Dhillon MD  [facility] - [address]  p [phone]              POST-OPERATIVE INSTRUCTIONS LAPAROSCOPIC/ROBOTIC SURGERY    Thank you very much for allowing me to participate in your care, it is truly a privilege. Below please see discharge orders that will help you during your recovery. Please do not hesitate to call the office at [phone] for any questions. After hours, the same number will allow you to reach the on-call surgeon.     Call the office [phone] to schedule your 2 week post-operative appointment with Dr. Dhillon or her Physician Assistant (PA).    Change bandages daily or more frequently if needed.  Keep incisions clean with soap/ water daily.   Cover incision(s) as needed.  If you have skin glue this will come off on its own    May place an ice pack over your incisions on and off  (15min) at a time for the next 24-48 hours.    Resume regular diet as tolerated (recommend starting with liquids)    General guidelines for activity:      Avoid strenuous activity or lifting anything heavier than 15 pounds.    It is OK to be up and walking around. Going up and down stairs is also OK.    Do what is comfortable: stop and rest when you feel tired.    It is OK to shower after 24 hours    You will have pain medicine ordered. Take as directed/needed.    Some discomfort, mild bruising, and swelling are not unusual; please call my office if you have any severe pain, bleeding, or high fever (over 101°F)    During the robotic/laparoscopic procedure that you had, gas is pumped into the abdominal cavity.  You may feel abdominal, shoulder, or rib pain for a few days due to this.    Resume home medications (see medication reconciliation sheet)       Do NOT drive for one day and while taking your narcotic pain medicine.        Watch for signs of infection:    Excessive warmth or bright redness around your incisions    Leakage of bloody or cloudy fluid from you incisions    Fever over 100.5    If you experience constipation  Increase your water intake.  Increase your activity; walking is best.  An over the counter stool softener or mild laxative may be necessary if you still have not had a bowel movement after several days.       Please call the office at [phone] for any questions and to make your post op appointment if needed. Thank you again for allowing me to participate in your care, and get well soon!      Karey Dhillon MD  [facility] - General Surgery   [address] [place] [address]  p [phone]    General Medical Follow up:  [facility] (healthcare clinic) [email or web address]  VNA welcomes patients with Medicaid, Medicare, private insurance or no insurance at all, possibly at a discounted rate.  offers low cost prescriptions drugs when seen by  a [facility] physician.   [facility] [address], [place], [place] [address] [phone]  [facility] [place] [address], [place] or [address] [facility], [place] [phone]  [facility] [address] [place], [place] or [address] [place], [place]  [phone]  [facility] [address] [place] [address] [phone]  [facility] [address], [place] [address] [phone]  [facility] [address] [place] [address] [phone]  [place] [address], [address] [place] [address]  Danay solis sonia aqui o evelineame al teléfono [phone] o al [phone].     [facility] provides care for chronic disease management, and offers coordinated, patient-centered care.  Their services include adult health, pediatric health, women's health, dental services, behavioral health services and LGBTQ+ health.  [facility] works to eliminate any barriers that may keep our patients from accessing services. We do this by providing 24-hour access to providers, comprehensive care management, transportation assistance, access to reduced-price pharmaceuticals, on-site laboratories, same and next-day appointments, bilingual staff and translation services.    [facility]- [address] [facility]- [address] [facility], [address], [place], [place]   [facility], [place]   GF[address] [place], [place]   GF- [address] [place], [place]   [facility], [place], [place]   GF- [address] [facility]- [address] [facility]- [address] [facility]- [address], [place] [facility]- [address], [place], [place]     [email or web address]  Main number [phone]    Visit [facility]  for discounted prescription drug coupons or [employer] for $4 prescriptions https://[email or web address]-[email or web address]-prescriptions/9867878    To inquire about [place] Medicaid, call [facility] [phone] or visit [email or web address]    Healthcare.gov - Marketplace insurance    The Extra Help Program: is designed to help eligible Medicare Part D patients with high out of pocket costs. Contact this program directly by calling [employer] at 1-704.620.9086    Through the Medicare Plan Finder, you can search to find more information about cost and your specific prescription coverage.  www.medicare.gov/find-a-plan.    Formerly [facility] Health Care Program (only apply if you do not Qualify for medicaid)  [facility]  [address] [place] [address]; [address]  Shelly James  Phone: [phone]  [email or web address]    [facility] [address] [address]  [phone]  [email or web address]    Questions about financial assistance, application, or uninsured discounts can be directed to [phone]

## 2025-05-10 NOTE — OPERATIVE REPORT
OPERATIVE NOTE    PATIENT NAME: Domi Cordova    :  1960    MRN:  [id number]  ATTENDING PHYSICIAN:  Diana Lopez,*    PROCEDURE DATE:  5/10/2025       PREOPERATIVE DIAGNOSIS: Acute appendicitis     POSTOPERATIVE DIAGNOSIS: Acute perforated appendicitis     SURGEON: Karey Dhillon MD    ASSISTANT: Jacqueline Marquis CSA     OPERATION: 1) Laparoscopic appendectomy    ANESTHESIA: General    ESTIMATED BLOOD LOSS:  5 ml    COMPLICATIONS: none     SPECIMENS: Appendix    INDICATIONS: The patient is a 64 year old year old female with history of above preop diagnosis.  I explained the risk, benefits, expected outcome, and alternatives to the procedure. Patient understands the risks include but not inclusive to bleeding, infection, anesthesia complication, blood vessel/nerve damage, chronic pain, reoperation, and failure of the procedure to obtain its intended goals.  Patient understands and is in agreement and would like to proceed.     DESCRIPTION OF PROCEDURE:    Patient was taken to the operating room and underwnet general endotracheal anesthesia. Patient was placed in the supine position with the left arm tucked. Bony prominences were protected on the operating table. The abdomen was prepped and draped in the usual standard sterile fashion.  An appropriate timeout protocol was completed verifying patient and correct procedures to be completed.  All parties agreed.      A small skin incision was made at Phillips's point in the left upper quadrant. Using a Veress needle, access was gained to the intraabdominal cavity.  Pneumoperitoneum was established with CO2 gas to pressure of 15 mm Hg.  5mm optiview trocar was placed in the left upper quadrant. Another 5mm port was inserted in the suprapubic region. One 12 mm port was placed under direct vision at the left lateral abdomen.  The patient was placed in Trendelenburg position with a tilt towards the left.  The small bowel and sigmoid colon were swept to  the patient's left exposing the cecum. The cecum was identified and an inflamed appendix was also identified.  The terminal ileum was identified and protected. The appendix was grasped at mid body and retracted away from the cecum with a laparoscopic grasper. The appendix was perforated near the base with surrounding inflammation and phlegmon involving the terminal ileum and omentum with spillage of stool and appendicolith. The appendix was carefully dissected away from the surrounding structures. The mesoappendix was divided using Ligasure energy device. The appendix was then divided including a cuff of cecum using endoscopic BRITTA linear cutting stapler purple tissue load x2.  No bleeding was observed from the staple line.  Hemostasis was achieved.  The right lower quadrant was copiously irrigated with saline and purulent pelvic fluid was also irrigated out. The appendix and appendicolith were then removed from the abdomen using a laparoscopic specimen bag. The 12 mm port fascia was closed with 0-vicryl under direct visualization using a laparoscopic suture passer. The trocar sites were infiltrated with 0.25% marcaine in the TAP plane and the skin. The abdomen was then desulflated and cannulas removed under direct visualization.  The wounds were irrigated and the skin incisions closed with interrupted 4-0 Moncryl subcuticular sutures.  Dermabond was applied.  All instrument, sponge and needle counts were correct.  I was present and scrubbed for the entire operation. The patient tolerated the procedure well and was sent to PACU in stable condition.      Karey Dhillon MD  [facility] - General Surgery  [address] [address]  p [phone]

## 2025-05-10 NOTE — ANESTHESIA PROCEDURE NOTES
Airway  Date/Time: 5/10/2025 9:54 AM  Reason: Elective    Airway not difficult    General Information and Staff   Patient location during procedure: OR  Anesthesiologist: Kim Carrasco MD  Performed: anesthesiologist   Performed by: Kim Carrasco MD  Authorized by: Kim Carrasco MD        Indications and Patient Condition  Indications for airway management: anesthesia  Sedation level: deep      Preoxygenated: yesPatient position: sniffing    Mask difficulty assessment: 1 - vent by mask    Final Airway Details    Final airway type: endotracheal airway    Successful airway: ETT  Cuffed: yes   Successful intubation technique: Video laryngoscopy  Facilitating devices/methods: intubating stylet and cricoid pressure  Endotracheal tube insertion site: oral  Blade: GlideScope  Blade size: #3  ETT size (mm): 7.0    Cormack-Lehane Classification: grade I - full view of glottis  Placement verified by: capnometry   Measured from: teeth  Number of attempts at approach: 1

## 2025-05-10 NOTE — PLAN OF CARE
Patient Alert and Oriented x4. On Room air. Dilauidid for pain. Family at bedside. Fall precautions in place. Call light and personal belongings within arms reach. Plan for Surgery today   Problem: Patient Centered Care  Goal: Patient preferences are identified and integrated in the patient's plan of care  Description: Interventions:- What would you like us to know as we care for you? - Provide timely, complete, and accurate information to patient/family- Incorporate patient and family knowledge, values, beliefs, and cultural backgrounds into the planning and delivery of care- Encourage patient/family to participate in care and decision-making at the level they choose- Honor patient and family perspectives and choices  Outcome: Progressing     Problem: PAIN - ADULT  Goal: Verbalizes/displays adequate comfort level or patient's stated pain goal  Description: INTERVENTIONS:- Encourage pt to monitor pain and request assistance- Assess pain using appropriate pain scale- Administer analgesics based on type and severity of pain and evaluate response- Implement non-pharmacological measures as appropriate and evaluate response- Consider cultural and social influences on pain and pain management- Manage/alleviate anxiety- Utilize distraction and/or relaxation techniques- Monitor for opioid side effects- Notify MD/LIP if interventions unsuccessful or patient reports new pain- Anticipate increased pain with activity and pre-medicate as appropriate  Outcome: Progressing     Problem: RISK FOR INFECTION - ADULT  Goal: Absence of fever/infection during anticipated neutropenic period  Description: INTERVENTIONS- Monitor WBC- Administer growth factors as ordered- Implement neutropenic guidelines  Outcome: Progressing     Problem: SAFETY ADULT - FALL  Goal: Free from fall injury  Description: INTERVENTIONS:- Assess pt frequently for physical needs- Identify cognitive and physical deficits and behaviors that affect risk of falls.-  [place] fall precautions as indicated by assessment.- Educate pt/family on patient safety including physical limitations- Instruct pt to call for assistance with activity based on assessment- Modify environment to reduce risk of injury- Provide assistive devices as appropriate- Consider OT/PT consult to assist with strengthening/mobility- Encourage toileting schedule  Outcome: Progressing

## 2025-05-10 NOTE — ANESTHESIA PREPROCEDURE EVALUATION
Anesthesia PreOp Note    HPI:     Domi Cordova is a 64 year old female who presents for preoperative consultation requested by: Karey Dhillon MD    Date of Surgery: 5/9/2025 - 5/10/2025    Procedure(s):  LAPAROSCOPIC APPENDECTOMY  Indication: Appendicitis [K37]    Relevant Problems   No relevant active problems       NPO:  Last Liquid Consumption Date: 05/09/25  Last Liquid Consumption Time: 1200  Last Solid Consumption Date: 05/09/25  Last Solid Consumption Time: 1200  Last Liquid Consumption Date: 05/09/25          History Review:  Patient Active Problem List    Diagnosis Date Noted    Leukocytosis 05/09/2025    Acute appendicitis with localized peritonitis without gangrene, unspecified whether abscess present, unspecified whether perforation present 05/09/2025    Hyperglycemia 10/16/2021       Past Medical History[1]    Past Surgical History[2]    Prescriptions Prior to Admission[3]  Current Medications and Prescriptions Ordered in Epic[4]    Allergies[5]    Family History[6]  Social Hx on file[7]    Available pre-op labs reviewed.  Lab Results   Component Value Date    WBC 19.2 (H) 05/09/2025    RBC 4.21 05/09/2025    HGB 12.3 05/09/2025    HCT 37.5 05/09/2025    MCV 89.1 05/09/2025    MCH 29.2 05/09/2025    MCHC 32.8 05/09/2025    RDW 12.5 05/09/2025    .0 05/09/2025     Lab Results   Component Value Date     05/09/2025    K 3.6 05/09/2025     05/09/2025    CO2 26.0 05/09/2025    BUN 13 05/09/2025    CREATSERUM 0.75 05/09/2025     (H) 05/09/2025    PGLU 163 (H) 05/10/2025    CA 9.3 05/09/2025          Vital Signs:  Body mass index is 24.61 kg/m².   height is 1.524 m (5') and weight is 57.2 kg (126 lb). Her oral temperature is 99 °F (37.2 °C). Her blood pressure is 94/61 and her pulse is 94. Her respiration is 18 and oxygen saturation is 94%.   Vitals:    05/09/25 2337 05/09/25 2344 05/10/25 0449 05/10/25 0806   BP: 139/56  96/61 94/61   Pulse: 112  108 94   Resp: 20   18    Temp: (!) 102.8 °F (39.3 °C)  98.5 °F (36.9 °C) 99 °F (37.2 °C)   TempSrc: Oral   Oral   SpO2: 94%  94% 94%   Weight:  57.2 kg (126 lb)     Height:            Anesthesia Evaluation     Patient summary reviewed and Nursing notes reviewed    No history of anesthetic complications   Airway   Mallampati: I  TM distance: >3 FB  Neck ROM: full  Dental      Pulmonary    Cardiovascular - normal exam    Neuro/Psych    (+)  neuromuscular disease,        GI/Hepatic/Renal      Endo/Other    (+) diabetes mellitus  Abdominal                  Anesthesia Plan:   ASA:  3  Plan:   General  Informed Consent Plan and Risks Discussed With:  Patient      I have informed Domi Cordova and/or legal guardian or family member of the nature of the anesthetic plan, benefits, risks including possible dental damage if relevant, major complications, and any alternative forms of anesthetic management.   All of the patient's questions were answered to the best of my ability. The patient desires the anesthetic management as planned.  Kim Carrasco MD  5/10/2025 8:45 AM  Present on Admission:   Leukocytosis           [1]   Past Medical History:   Diabetes (HCC)    DM (diabetes mellitus) (HCC)    Sciatica   [2] History reviewed. No pertinent surgical history.  [3]   Medications Prior to Admission   Medication Sig Dispense Refill Last Dose/Taking    glipiZIDE 5 MG Oral Tab Take 1 tablet (5 mg total) by mouth every morning.   5/9/2025    metFORMIN 500 MG Oral Tab Take 1 tablet (500 mg total) by mouth 2 (two) times daily.   5/9/2025    valACYclovir 500 MG Oral Tab Take 1 tablet (500 mg total) by mouth daily.   5/9/2025    azithromycin 250 MG Oral Tab Take 1 tablet (250 mg total) by mouth daily. 3 tablet 0     Insulin NPH & Regular 70/30 (70-30) 100 UNIT/ML Subcutaneous Suspension Inject 30 Units into the skin 2 (two) times daily before meals. 10 mL 0    [4]   Current Facility-Administered Medications Ordered in Epic   Medication Dose Route  Frequency Provider Last Rate Last Admin    HYDROmorphone (Dilaudid) 1 MG/ML injection 0.5 mg  0.5 mg Intravenous Q2H PRN Gume Palacios MD   0.5 mg at 05/10/25 0635    Or    HYDROmorphone (Dilaudid) 1 MG/ML injection 1 mg  1 mg Intravenous Q2H PRN Gume Palacios MD   1 mg at 05/10/25 0104    ondansetron (Zofran) 4 MG/2ML injection 4 mg  4 mg Intravenous Q6H PRN Gume Palacios MD        morphINE PF 2 MG/ML injection 2 mg  2 mg Intravenous Q2H PRN Gume Palacios MD        Or    morphINE PF 4 MG/ML injection 4 mg  4 mg Intravenous Q2H PRN Gume Palacios MD   4 mg at 05/09/25 2353    sodium chloride 0.9% infusion   Intravenous Continuous Gume Palacios  mL/hr at 05/10/25 0016 New Bag at 05/10/25 0016    metoclopramide (Reglan) 5 mg/mL injection 10 mg  10 mg Intravenous Q6H PRN Gume Palacios MD        ketorolac (Toradol) 30 MG/ML injection 30 mg  30 mg Intravenous Q6H PRN Gume Palacios MD        famotidine (Pepcid) 20 mg/2mL injection 20 mg  20 mg Intravenous BID Gume Palacios MD   20 mg at 05/10/25 0819    piperacillin-tazobactam (Zosyn) 3.375 g in dextrose 5% 100 mL IVPB-ADDV  3.375 g Intravenous Q8H Gume Palacios MD 25 mL/hr at 05/10/25 0320 3.375 g at 05/10/25 0320    glucose (Dex4) 15 GM/59ML oral liquid 15 g  15 g Oral Q15 Min PRN Gume Palacios MD        Or    glucose (Glutose) 40% oral gel 15 g  15 g Oral Q15 Min PRN Gume Palacios MD        Or    glucose-vitamin C (Dex-4) chewable tab 4 tablet  4 tablet Oral Q15 Min PRN Gume Palacios MD        Or    dextrose 50% injection 50 mL  50 mL Intravenous Q15 Min PRN Gume Palacios MD        Or    glucose (Dex4) 15 GM/59ML oral liquid 30 g  30 g Oral Q15 Min PRN Gume Palacios MD        Or    glucose (Glutose) 40% oral gel 30 g  30 g Oral Q15 Min PRN Gume Palacios MD        Or    glucose-vitamin C (Dex-4) chewable tab 8 tablet  8 tablet Oral Q15 Min PRN Gume Palacios MD        insulin degludec (Tresiba) 100 units/mL  flextouch 10 Units  10 Units Subcutaneous Daily Gume Palacios MD   10 Units at 05/10/25 0839    insulin regular human (Novolin R, Humulin R) 100 UNIT/ML injection 1-7 Units  1-7 Units Subcutaneous 4 times per day Gume Palacios MD   1 Units at 05/10/25 0600     No current Epic-ordered outpatient medications on file.   [5] No Known Allergies  [6]   Family History  Problem Relation Age of Onset    Diabetes Father     Heart Disorder Mother     Diabetes Sister    [7]   Social History  Socioeconomic History    Marital status: Single   Tobacco Use    Smoking status: Every Day     Current packs/day: 0.50     Types: Cigarettes    Smokeless tobacco: Never   Substance and Sexual Activity    Alcohol use: Never

## 2025-05-10 NOTE — ED QUICK NOTES
Orders for admission, patient is aware of plan and ready to go upstairs. Any questions, please call ED RN Chano at extension [phone].     Patient Covid vaccination status: Unvaccinated     COVID Test Ordered in ED: None    COVID Suspicion at Admission: N/A    Running Infusions: Medication Infusions[1] bolus    Mental Status/LOC at time of transport: a/o 4    Other pertinent information: Libyan speaking  CIWA score: N/A   NIH score:  N/A             [1]

## 2025-05-10 NOTE — PROGRESS NOTES
[facility]  part of [facility]    Progress Note    Domi Cordova Patient Status:  Inpatient    1960 MRN [id number]   Location [facility] 4W/SW/SE Attending Diana Lopez,*   Hosp Day # 1 PCP None Pcp     Chief Complaint: ok    Subjective:     Constitutional:  Positive for activity change, appetite change and fatigue. Negative for diaphoresis.   HENT:  Negative for congestion.    Respiratory:  Negative for chest tightness and shortness of breath.    Cardiovascular:  Negative for chest pain and leg swelling.   Gastrointestinal:  Positive for abdominal pain. Negative for blood in stool.   Musculoskeletal:  Negative for joint swelling, joint pain and gait problem.   Neurological:  Negative for weakness.   Psychiatric/Behavioral:  Positive for sleep disturbance. The patient is not nervous/anxious.        Objective:   Blood pressure 132/65, pulse 98, temperature 98.3 °F (36.8 °C), temperature source Oral, resp. rate 16, height 5' (1.524 m), weight 126 lb (57.2 kg), SpO2 97%, not currently breastfeeding.  Physical Exam  Vitals and nursing note reviewed.   Constitutional:       General: She is not in acute distress.     Appearance: She is obese. She is ill-appearing. She is not toxic-appearing or diaphoretic.   HENT:      Head: Normocephalic and atraumatic.   Cardiovascular:      Rate and Rhythm: Normal rate.      Pulses: Normal pulses.      Heart sounds: No murmur heard.  Pulmonary:      Effort: Pulmonary effort is normal. No respiratory distress.      Breath sounds: No wheezing.   Abdominal:      General: There is no distension.      Tenderness: There is abdominal tenderness. There is no rebound.   Musculoskeletal:         General: No swelling or deformity.   Skin:     General: Skin is warm and dry.   Neurological:      Mental Status: She is alert. Mental status is at baseline.         Results:   Lab Results   Component Value Date    WBC 19.2 (H) 2025    HGB  12.3 05/09/2025    HCT 37.5 05/09/2025    .0 05/09/2025    CREATSERUM 0.75 05/09/2025    BUN 13 05/09/2025     05/09/2025    K 3.6 05/09/2025     05/09/2025    CO2 26.0 05/09/2025     (H) 05/09/2025    CA 9.3 05/09/2025    ALB 4.5 05/09/2025    ALKPHO 99 05/09/2025    BILT 0.8 05/09/2025    TP 7.5 05/09/2025    AST 22 05/09/2025    ALT 18 05/09/2025    LIP 29 05/09/2025       CT ABDOMEN+PELVIS(CONTRAST ONLY)(CPT=74177)  Result Date: 5/9/2025  CONCLUSION:   1. Acute appendicitis with probable appendicolith at the base.  Extensive surrounding inflammatory changes are noted, possibly phlegmon or developing abscess.  However, there is no definite evidence of rupture.   2. Cholelithiasis with mild gallbladder wall thickening.  Consider further evaluation with right upper quadrant ultrasound when clinically able.  3. Additional chronic or incidental findings are described in the body of this report.    elm-remote    Dictated by (CST): Shankar Christine MD on 5/09/2025 at 10:07 PM     Finalized by (CST): Shankar Christine MD on 5/09/2025 at 10:10 PM                Assessment & Plan:        Acute appendicitis complicated by phlegmon versus abscess; perf appe     Cholelithiasis with possible cholecystitis; mild thickening on ct; no pain to palp; lft ok  Currently on antibiotics, LFTs within normal limits, as stated previously surgery consulted, will continue to monitor clinically; us if lft changes or pain     Insulin requiring diabetes  Blood sugar suboptimally controlled, resume home dose of insulin use sliding scale coverage as needed, check A1c. NOVOLIN CHANGED TO NOVOLOG; PT STATES SHE WILL EAT     Prophylaxis  SCDs, heparin on hold till patient evaluated by surgery.     CODE STATUS  Full     Primary care physician  None Pcp; dr fields on call for no doc    Complex mdm; coordinating care with nurse and counseling pt and with her permission her sister in room about appe; rest; inc jonatan    Ct images  reviewed      ROSALIE FRANCIS MD

## 2025-05-10 NOTE — ED PROVIDER NOTES
Patient Seen in: [facility] Emergency Department      History     Chief Complaint   Patient presents with    Pelvic Pain    Abdomen/Flank Pain     Stated Complaint: Pelvic Pain    Subjective:   HPI    Patient presents emergency department complaining of right lower quadrant abdominal pain.  She states that for the last 2 to 3 days she has had progressive increasing severity dull throbbing aching left-sided abdominal discomfort.  There is no vomiting or diarrhea.  She has a history of diabetes.  There is no previous surgical history.  History of Present Illness               Objective:     Past Medical History:    Diabetes (HCC)    DM (diabetes mellitus) (HCC)    Sciatica              History reviewed. No pertinent surgical history.             Social History     Socioeconomic History    Marital status: Single   Tobacco Use    Smoking status: Every Day     Current packs/day: 0.50     Types: Cigarettes    Smokeless tobacco: Never   Substance and Sexual Activity    Alcohol use: Never     Social Drivers of Health     Food Insecurity: No Food Insecurity (5/9/2025)    NCSS - Food Insecurity     Worried About Running Out of Food in the Last Year: No     Ran Out of Food in the Last Year: No   Transportation Needs: No Transportation Needs (5/9/2025)    NCSS - Transportation     Lack of Transportation: No   Housing Stability: Not At Risk (5/9/2025)    NCSS - Housing/Utilities     Has Housing: Yes     Worried About Losing Housing: No     Unable to Get Utilities: No                                Physical Exam     ED Triage Vitals   BP 05/09/25 1927 93/53   Pulse 05/09/25 1927 106   Resp 05/09/25 1927 20   Temp 05/09/25 1927 97.9 °F (36.6 °C)   Temp src 05/09/25 2308 Oral   SpO2 05/09/25 1927 98 %   O2 Device 05/09/25 1927 None (Room air)       Current Vitals:   Vital Signs  BP: 132/65  Pulse: 98  Resp: 16  Temp: 98.3 °F (36.8 °C)  Temp src: Oral  MAP (mmHg): 85    Oxygen Therapy  SpO2: 97 %  O2 Device: None (Room  air)  Pulse Oximetry Type: Intermittent  Oximetry Probe Site Changed: No  Pulse Ox Probe Location: Right hand        Physical Exam  Vitals and nursing note reviewed.   Constitutional:       General: She is not in acute distress.     Appearance: She is well-developed.   HENT:      Head: Normocephalic.      Nose: Nose normal.      Mouth/Throat:      Mouth: Mucous membranes are moist.   Eyes:      Conjunctiva/sclera: Conjunctivae normal.   Cardiovascular:      Rate and Rhythm: Normal rate and regular rhythm.      Heart sounds: No murmur heard.  Pulmonary:      Effort: Pulmonary effort is normal. No respiratory distress.      Breath sounds: Normal breath sounds.   Abdominal:      General: There is no distension.      Palpations: Abdomen is soft.      Tenderness: There is abdominal tenderness in the right lower quadrant. There is no guarding or rebound. Positive signs include McBurney's sign.   Musculoskeletal:         General: No tenderness. Normal range of motion.      Cervical back: Normal range of motion and neck supple.   Skin:     General: Skin is warm and dry.      Findings: No rash.   Neurological:      Mental Status: She is alert and oriented to person, place, and time.           Physical Exam                ED Course     Labs Reviewed   COMP METABOLIC PANEL (14) - Abnormal; Notable for the following components:       Result Value    Glucose 143 (*)     All other components within normal limits   CBC WITH DIFFERENTIAL WITH PLATELET - Abnormal; Notable for the following components:    WBC 19.2 (*)     Neutrophil Absolute Prelim 15.39 (*)     Neutrophil Absolute 15.39 (*)     Monocyte Absolute 1.07 (*)     All other components within normal limits   URINALYSIS WITH CULTURE REFLEX - Abnormal; Notable for the following components:    Ketones Urine Trace (*)     Protein Urine 20 (*)     Urobilinogen Urine 4 (*)     Leukocyte Esterase Urine 25 (*)     WBC Urine 6-10 (*)     Squamous Epi. Cells Few (*)     All other  components within normal limits   HEMOGLOBIN A1C - Abnormal; Notable for the following components:    HgbA1C 7.7 (*)     Estimated Average Glucose 174 (*)     All other components within normal limits   POCT GLUCOSE - Abnormal; Notable for the following components:    POC Glucose  202 (*)     All other components within normal limits   POCT GLUCOSE - Abnormal; Notable for the following components:    POC Glucose  163 (*)     All other components within normal limits   POCT GLUCOSE - Abnormal; Notable for the following components:    POC Glucose  153 (*)     All other components within normal limits   POCT GLUCOSE - Abnormal; Notable for the following components:    POC Glucose  168 (*)     All other components within normal limits   POCT GLUCOSE - Abnormal; Notable for the following components:    POC Glucose  201 (*)     All other components within normal limits   LIPASE - Normal   LACTIC ACID, PLASMA - Normal   TYPE AND SCREEN    Narrative:     The following orders were created for panel order Type and screen.  Procedure                               Abnormality         Status                     ---------                               -----------         ------                     ABORH (Blood Type)[[id number]]                               Final result               Antibody Screen[[id number]]                                  Final result                 Please view results for these tests on the individual orders.   ABORH (BLOOD TYPE)   ANTIBODY SCREEN   ABORH CONFIRMATION   SURGICAL PATHOLOGY TISSUE   RAINBOW DRAW LAVENDER   RAINBOW DRAW LIGHT GREEN   URINE CULTURE, ROUTINE   BLOOD CULTURE   BLOOD CULTURE          Results                           MDM          Admission disposition: 5/9/2025 10:22 PM           Medical Decision Making  Differential diagnosis considered for colitis, diverticulitis, appendicitis, enteritis.    Problems Addressed:  Acute appendicitis with localized peritonitis without gangrene,  unspecified whether abscess present, unspecified whether perforation present: acute illness or injury    Amount and/or Complexity of Data Reviewed  Labs: ordered. Decision-making details documented in ED Course.     Details: Elevated white blood cell count.  Radiology: ordered and independent interpretation performed. Decision-making details documented in ED Course.     Details: CT scan shows acute appendicitis with possible early phlegmon formation  Discussion of management or test interpretation with external provider(s): This case was discussed with the hospitalist as well as general surgery.  Zosyn initiated in the emergency department, IV fluids and pain medication.  N.p.o. in anticipation of surgical intervention in the morning.    Risk  Prescription drug management.  Parenteral controlled substances.  Decision regarding hospitalization.        Disposition and Plan     Clinical Impression:  1. Acute appendicitis with localized peritonitis without gangrene, unspecified whether abscess present, unspecified whether perforation present    2. Appendicitis         Disposition:  Admit  5/9/2025 10:22 pm    Follow-up:  [facility]  [address]  [place] [facility] [address]  [phone]    Follow up in 2 week(s)  post op lap appy 5/10          Medications Prescribed:  Current Discharge Medication List        START taking these medications    Details   oxyCODONE-acetaminophen 5-325 MG Oral Tab Take 1-2 tablets by mouth every 4 (four) hours as needed.  Qty: 5 tablet, Refills: 0    Associated Diagnoses: Acute appendicitis with localized peritonitis without gangrene, unspecified whether abscess present, unspecified whether perforation present      Ibuprofen (ADVIL) 200 MG Oral Cap Take 2 capsules (400 mg total) by mouth every 6 (six) hours as needed.  Qty: 40 capsule, Refills: 0    Associated Diagnoses: Acute appendicitis with localized peritonitis without gangrene, unspecified whether abscess present, unspecified  whether perforation present             Supplementary Documentation:         Hospital Problems       Present on Admission           ICD-10-CM Noted POA    * (Principal) Acute appendicitis with localized peritonitis without gangrene, unspecified whether abscess present, unspecified whether perforation present K35.30 5/9/2025 Unknown    Leukocytosis D72.829 5/9/2025 Yes

## 2025-05-10 NOTE — H&P
[facility]  part of [facility]    History & Physical    Domi Cordova Patient Status:  Emergency    1960 MRN [id number]   Location [facility] EMERGENCY DEPARTMENT Attending Kaiden Mcgraw MD   Hosp Day # 0 PCP None Pcp     Date:  2025  Date of Admission:  2025    Chief Complaint:  Chief Complaint   Patient presents with    Pelvic Pain    Abdomen/Flank Pain       History of Present Illness:  Domi Cordova is a(n) 64 year old female, with a past medical history significant for diabetes presents with a complaint of right lower quadrant abdominal pain that started rather abruptly 2 days ago rates her pain as a 10 out of 10 at its worst aggravated by movement and touch with no relieving factors.  She describes it as sharp stabbing in nature, at times takes her breath away.  Denies any associated nausea or vomiting denies any diarrhea or constipation.  Denies any fever or chills.  CT scan done in ER shows evidence of acute appendicitis with likely abscess formation    History:  Past Medical History[1]  Past Surgical History[2]  Family History[3]   reports that she has been smoking. She has never used smokeless tobacco. She reports that she does not drink alcohol.    Allergies:  Allergies[4]    Home Medications:  Prior to Admission Medications   Prescriptions Last Dose Informant Patient Reported? Taking?   Insulin NPH & Regular 70/30 (70-30) 100 UNIT/ML Subcutaneous Suspension   No No   Sig: Inject 30 Units into the skin 2 (two) times daily before meals.   azithromycin 250 MG Oral Tab   No No   Sig: Take 1 tablet (250 mg total) by mouth daily.      Facility-Administered Medications: None       Review of Systems:  Constitutional:  Weakness, Fatigue.  Eye:  Negative.  Ear/Nose/Mouth/Throat:  Negative.  Respiratory:  Negative  Cardiovascular: Negative  Gastrointestinal: Abdominal pain  Genitourinary:  Negative  Endocrine:  Negative.  Immunologic:   Negative.  Musculoskeletal:  Negative.  Integumentary:  Negative.  Neurologic:  Negative.  Psychiatric:  Negative.  ROS reviewed as documented in chart    Physical Exam:  Temp:  [97.9 °F (36.6 °C)-98.1 °F (36.7 °C)] 98.1 °F (36.7 °C)  Pulse:  [] 95  Resp:  [20] 20  BP: ()/(52-61) 129/52  SpO2:  [98 %] 98 %    General:  Alert and oriented.  Diffuse skin problem:  None.  Eye:  Pupils are equal, round and reactive to light, extraocular movements are intact, Normal conjunctiva.  HENT:  Normocephalic, oral mucosa is moist.  Head:  Normocephalic, atraumatic.  Neck:  Supple, non-tender, no carotid bruit, no jugular venous distention, no lymphadenopathy, no thyromegaly.  Respiratory:  Lungs are clear to auscultation, respirations are non-labored, breath sounds are equal, symmetrical chest wall expansion.  Cardiovascular:  Normal rate, regular rhythm, no murmur, no edema.  Gastrointestinal:  Soft, right lower quadrant tenderness with positive rebound and voluntary guarding, non-distended, normal bowel sounds, no organomegaly.  Lymphatics:  No lymphadenopathy neck, axilla, groin.  Musculoskeletal: Normal range of motion.  normal strength.  Feet:  Normal pulses.  Neurologic:  Alert, oriented, no focal deficits, cranial nerves II-XII are grossly intact.  Cognition and Speech:  Oriented, speech clear and coherent.  Psychiatric:  Cooperative, appropriate mood & affect.      Laboratory Data:   Lab Results   Component Value Date    WBC 19.2 05/09/2025    HGB 12.3 05/09/2025    HCT 37.5 05/09/2025    .0 05/09/2025    CREATSERUM 0.75 05/09/2025    BUN 13 05/09/2025     05/09/2025    K 3.6 05/09/2025     05/09/2025    CO2 26.0 05/09/2025     05/09/2025    CA 9.3 05/09/2025    ALB 4.5 05/09/2025    ALKPHO 99 05/09/2025    BILT 0.8 05/09/2025    TP 7.5 05/09/2025    AST 22 05/09/2025    ALT 18 05/09/2025    LIP 29 05/09/2025       Imaging:  CT ABDOMEN+PELVIS(CONTRAST ONLY)(CPT=74177)  Result Date:  5/9/2025  CONCLUSION:   1. Acute appendicitis with probable appendicolith at the base.  Extensive surrounding inflammatory changes are noted, possibly phlegmon or developing abscess.  However, there is no definite evidence of rupture.   2. Cholelithiasis with mild gallbladder wall thickening.  Consider further evaluation with right upper quadrant ultrasound when clinically able.  3. Additional chronic or incidental findings are described in the body of this report.    elm-remote    Dictated by (CST): Shankar Christine MD on 5/09/2025 at 10:07 PM     Finalized by (CST): Shankar Christine MD on 5/09/2025 at 10:10 PM             Assessment and Plan:    Acute appendicitis complicated by phlegmon versus abscess  Patient started on Rocephin in ER, will continue the same surgery has been consulted.  Will remain n.p.o. for now, IV fluids initiated, will use morphine for pain, Zofran for nausea.    Cholelithiasis with possible cholecystitis  Currently on antibiotics, LFTs within normal limits, as stated previously surgery consulted, will continue to monitor clinically    Insulin requiring diabetes  Blood sugar suboptimally controlled, resume home dose of insulin use sliding scale coverage as needed, check A1c.    Prophylaxis  SCDs, heparin on hold till patient evaluated by surgery.    CODE STATUS  Full    Primary care physician  None Pcp    MDM: High, acute illness/severe exacerbation of chronic illness posing threat to life.  IV medications requiring close inpatient monitoring  60 minutes spent on this admission - examining patient, obtaining history, reviewing previous medical records, going over test results/imaging and discussing plan of care. All questions answered.     Disposition  Clinical course will dictate outcome      ELHAM VILLAREAL MD  5/9/2025  10:22 PM         [1]   Past Medical History:   Diabetes (HCC)    DM (diabetes mellitus) (HCC)    Sciatica   [2] History reviewed. No pertinent surgical history.  [3]   Family  History  Problem Relation Age of Onset    Diabetes Father     Heart Disorder Mother     Diabetes Sister    [4] No Known Allergies

## 2025-05-11 LAB
ANION GAP SERPL CALC-SCNC: 4 MMOL/L (ref 0–18)
BASOPHILS # BLD AUTO: 0.05 X10(3) UL (ref 0–0.2)
BASOPHILS NFR BLD AUTO: 0.5 %
BUN BLD-MCNC: 13 MG/DL (ref 9–23)
BUN/CREAT SERPL: 22 (ref 10–20)
CALCIUM BLD-MCNC: 8.6 MG/DL (ref 8.7–10.4)
CHLORIDE SERPL-SCNC: 107 MMOL/L (ref 98–112)
CO2 SERPL-SCNC: 26 MMOL/L (ref 21–32)
CREAT BLD-MCNC: 0.59 MG/DL (ref 0.55–1.02)
DEPRECATED RDW RBC AUTO: 41.7 FL (ref 35.1–46.3)
EGFRCR SERPLBLD CKD-EPI 2021: 102 ML/MIN/1.73M2 (ref 60–?)
EOSINOPHIL # BLD AUTO: 0.02 X10(3) UL (ref 0–0.7)
EOSINOPHIL NFR BLD AUTO: 0.2 %
ERYTHROCYTE [DISTWIDTH] IN BLOOD BY AUTOMATED COUNT: 12.5 % (ref 11–15)
GLUCOSE BLD-MCNC: 103 MG/DL (ref 70–99)
GLUCOSE BLDC GLUCOMTR-MCNC: 110 MG/DL (ref 70–99)
GLUCOSE BLDC GLUCOMTR-MCNC: 131 MG/DL (ref 70–99)
GLUCOSE BLDC GLUCOMTR-MCNC: 94 MG/DL (ref 70–99)
GLUCOSE BLDC GLUCOMTR-MCNC: 98 MG/DL (ref 70–99)
HCT VFR BLD AUTO: 26.4 % (ref 35–48)
HGB BLD-MCNC: 9 G/DL (ref 12–16)
IMM GRANULOCYTES # BLD AUTO: 0.02 X10(3) UL (ref 0–1)
IMM GRANULOCYTES NFR BLD: 0.2 %
LYMPHOCYTES # BLD AUTO: 1.13 X10(3) UL (ref 1–4)
LYMPHOCYTES NFR BLD AUTO: 11.1 %
MAGNESIUM SERPL-MCNC: 1.9 MG/DL (ref 1.6–2.6)
MCH RBC QN AUTO: 30.8 PG (ref 26–34)
MCHC RBC AUTO-ENTMCNC: 34.1 G/DL (ref 31–37)
MCV RBC AUTO: 90.4 FL (ref 80–100)
MONOCYTES # BLD AUTO: 0.66 X10(3) UL (ref 0.1–1)
MONOCYTES NFR BLD AUTO: 6.5 %
NEUTROPHILS # BLD AUTO: 8.33 X10 (3) UL (ref 1.5–7.7)
NEUTROPHILS # BLD AUTO: 8.33 X10(3) UL (ref 1.5–7.7)
NEUTROPHILS NFR BLD AUTO: 81.5 %
OSMOLALITY SERPL CALC.SUM OF ELEC: 284 MOSM/KG (ref 275–295)
PHOSPHATE SERPL-MCNC: 2.5 MG/DL (ref 2.4–5.1)
PLATELET # BLD AUTO: 199 10(3)UL (ref 150–450)
POTASSIUM SERPL-SCNC: 3.7 MMOL/L (ref 3.5–5.1)
RBC # BLD AUTO: 2.92 X10(6)UL (ref 3.8–5.3)
SODIUM SERPL-SCNC: 137 MMOL/L (ref 136–145)
WBC # BLD AUTO: 10.2 X10(3) UL (ref 4–11)

## 2025-05-11 PROCEDURE — 80048 BASIC METABOLIC PNL TOTAL CA: CPT | Performed by: HOSPITALIST

## 2025-05-11 PROCEDURE — 82962 GLUCOSE BLOOD TEST: CPT

## 2025-05-11 PROCEDURE — 85025 COMPLETE CBC W/AUTO DIFF WBC: CPT | Performed by: HOSPITALIST

## 2025-05-11 PROCEDURE — 84100 ASSAY OF PHOSPHORUS: CPT | Performed by: HOSPITALIST

## 2025-05-11 PROCEDURE — 83735 ASSAY OF MAGNESIUM: CPT | Performed by: HOSPITALIST

## 2025-05-11 NOTE — PROGRESS NOTES
Progress Note     Domi Cordova Patient Status:  Inpatient    1960 MRN [id number]   Location [facility] 4W/SW/SE Attending Alexander Araujo MD   Hosp Day # 2 PCP None Pcp     Chief Complaint:   Chief Complaint   Patient presents with    Pelvic Pain    Abdomen/Flank Pain         Subjective:   S: Patient seen and examined, chart reviewed.   Feeling ok, some burping, no gas yet, not been up yet. Pod 1 s/p lap appy, ruptured appy.      Review of Systems:   10 point ROS completed and was negative, except for pertinent positive and negatives stated in subjective.                Objective:   Vital signs:  Temp:  [97.5 °F (36.4 °C)-98.8 °F (37.1 °C)] 98.8 °F (37.1 °C)  Pulse:  [] 82  Resp:  [16-24] 16  BP: (105-145)/(50-76) 105/57  SpO2:  [95 %-98 %] 98 %    Wt Readings from Last 6 Encounters:   05/10/25 126 lb (57.2 kg)   10/16/21 128 lb 11.2 oz (58.4 kg)       Physical Exam:    General: No acute distress.   Respiratory: Clear to auscultation bilaterally. No wheezes. No rhonchi.  Cardiovascular: S1, S2. Regular rate and rhythm. No murmurs, rubs or gallops.   Abdomen: Soft, tender, nondistended.  Positive bowel sounds.  Neurologic: No focal neurological deficits.   Musculoskeletal: Moves all extremities.  Extremities: No edema.    Results:   Diagnostic Data:      Labs:    Labs Last 24 Hours:   BMP     CBC    Other     Na 137 Cl 107 BUN 13 Glu 103   Hb 9.0   PTT - Procal -   K 3.7 CO2 26.0 Cr 0.59   WBC 10.2 >< .0  INR - CRP -   Renal Lytes Endo    Hct 26.4   Trop - D dim -   eGFR - Ca 8.6 POC Gluc  211    LFT   pBNP - Lactic -   eGFR AA - PO4 2.5 A1c -   AST - APk - Prot -  LDL -      Recent Labs   Lab 258 25  0451   RBC 4.21 2.92*   HGB 12.3 9.0*   HCT 37.5 26.4*   MCV 89.1 90.4   MCH 29.2 30.8   MCHC 32.8 34.1   RDW 12.5 12.5   NEPRELIM 15.39* 8.33*   WBC 19.2* 10.2   .0 199.0       No results found for: \"PT\", \"INR\"    Recent Labs   Lab 25  05/11/25  0451   * 103*   BUN 13 13   CREATSERUM 0.75 0.59   CA 9.3 8.6*   ALB 4.5  --     137   K 3.6 3.7    107   CO2 26.0 26.0   ALKPHO 99  --    AST 22  --    ALT 18  --    BILT 0.8  --    TP 7.5  --        Recent Labs   Lab 05/09/25  1948   LIP 29       Recent Labs   Lab 05/11/25  0451   MG 1.9   PHOS 2.5       No results for input(s): \"URINE\", \"CULTI\", \"BLDSMR\" in the last 168 hours.      CT ABDOMEN+PELVIS(CONTRAST ONLY)(CPT=74177)  Result Date: 5/9/2025  CONCLUSION:   1. Acute appendicitis with probable appendicolith at the base.  Extensive surrounding inflammatory changes are noted, possibly phlegmon or developing abscess.  However, there is no definite evidence of rupture.   2. Cholelithiasis with mild gallbladder wall thickening.  Consider further evaluation with right upper quadrant ultrasound when clinically able.  3. Additional chronic or incidental findings are described in the body of this report.    elm-remote    Dictated by (CST): Shankar Christine MD on 5/09/2025 at 10:07 PM     Finalized by (CST): Shankar Christine MD on 5/09/2025 at 10:10 PM                Pro-Calcitonin  No results for input(s): \"PCT\" in the last 168 hours.    Cardiac  No results for input(s): \"TROP\", \"PBNP\" in the last 168 hours.    Imaging: Imaging data reviewed in [facility].    CT ABDOMEN+PELVIS(CONTRAST ONLY)(CPT=74177)  Result Date: 5/9/2025  CONCLUSION:   1. Acute appendicitis with probable appendicolith at the base.  Extensive surrounding inflammatory changes are noted, possibly phlegmon or developing abscess.  However, there is no definite evidence of rupture.   2. Cholelithiasis with mild gallbladder wall thickening.  Consider further evaluation with right upper quadrant ultrasound when clinically able.  3. Additional chronic or incidental findings are described in the body of this report.    elm-remote    Dictated by (CST): Shankar Christine MD on 5/09/2025 at 10:07 PM     Finalized by (CST): Shankar Christine MD on  5/09/2025 at 10:10 PM               Medications: Scheduled Medications[1]    Assessment & Plan:   ASSESSMENT / PLAN:     Acute appendicitis complicated by phlegmon versus abscess; perf appy  Pod 1  Post op ileus  Clears today  Encourage ambulation     Cholelithiasis with no cholecystitis; mild thickening on ct; no pain to palp; lft ok  On abx. LFTs within normal limits, as stated previously surgery consulted, will continue to monitor clinically; us if lft changes or pain     Insulin requiring diabetes  Blood sugar suboptimally controlled, resume home dose of insulin use sliding scale coverage as needed, Hb A1c 7.7%. NOVOLIN CHANGED TO NOVOLOG; PT STATES SHE WILL EAT     Prophylaxis  SCDs, heparin   CODE STATUS  Full        Complex mdm; coordinating care with nurse and counseling pt and with her permission her sister in room about appe; rest; inc jonatan     Ct images reviewed    Estimated date of discharge: 1-3 days  Discharge is dependent on: surgical clear  At this point Ms. Dian Cordova is expected to be discharge to: home    Plan of care discussed with patient    Alexander Araujo MD, FAAP, FACP  [facility] Hospitalist  I respond to Epic Chat and AMS Connect         [1]    sodium phosphate  15 mmol Intravenous Once    acetaminophen  1,000 mg Oral Q8H    insulin degludec  12 Units Subcutaneous Daily    insulin aspart  1-7 Units Subcutaneous TID CC    enoxaparin  40 mg Subcutaneous Daily    valACYclovir  500 mg Oral Daily    pregabalin  100 mg Oral TID    famotidine  20 mg Intravenous BID    piperacillin-tazobactam  3.375 g Intravenous Q8H

## 2025-05-11 NOTE — PLAN OF CARE
Domi is A&Ox4 on room air. She is POD1. On a clear liquid diet. She is blenching and per patient has passed flatus once. She is ACHS. Encouraging ambulation. She is on IV ABX and IV fluids. Plan is for home when cleared.  Problem: Patient Centered Care  Goal: Patient preferences are identified and integrated in the patient's plan of care  Description: Interventions:- What would you like us to know as we care for you? - Provide timely, complete, and accurate information to patient/family- Incorporate patient and family knowledge, values, beliefs, and cultural backgrounds into the planning and delivery of care- Encourage patient/family to participate in care and decision-making at the level they choose- Honor patient and family perspectives and choices  Outcome: Progressing     Problem: PAIN - ADULT  Goal: Verbalizes/displays adequate comfort level or patient's stated pain goal  Description: INTERVENTIONS:- Encourage pt to monitor pain and request assistance- Assess pain using appropriate pain scale- Administer analgesics based on type and severity of pain and evaluate response- Implement non-pharmacological measures as appropriate and evaluate response- Consider cultural and social influences on pain and pain management- Manage/alleviate anxiety- Utilize distraction and/or relaxation techniques- Monitor for opioid side effects- Notify MD/LIP if interventions unsuccessful or patient reports new pain- Anticipate increased pain with activity and pre-medicate as appropriate  Outcome: Progressing     Problem: RISK FOR INFECTION - ADULT  Goal: Absence of fever/infection during anticipated neutropenic period  Description: INTERVENTIONS- Monitor WBC- Administer growth factors as ordered- Implement neutropenic guidelines  Outcome: Progressing     Problem: SAFETY ADULT - FALL  Goal: Free from fall injury  Description: INTERVENTIONS:- Assess pt frequently for physical needs- Identify cognitive and physical deficits and  behaviors that affect risk of falls.- [place] fall precautions as indicated by assessment.- Educate pt/family on patient safety including physical limitations- Instruct pt to call for assistance with activity based on assessment- Modify environment to reduce risk of injury- Provide assistive devices as appropriate- Consider OT/PT consult to assist with strengthening/mobility- Encourage toileting schedule  Outcome: Progressing     Problem: DISCHARGE PLANNING  Goal: Discharge to home or other facility with appropriate resources  Description: INTERVENTIONS:- Identify barriers to discharge w/pt and caregiver- Include patient/family/discharge partner in discharge planning- Arrange for needed discharge resources and transportation as appropriate- Identify discharge learning needs (meds, wound care, etc)- Arrange for interpreters to assist at discharge as needed- Consider post-discharge preferences of patient/family/discharge partner- Complete POLST form as appropriate- Assess patient's ability to be responsible for managing their own health- Refer to Case Management Department for coordinating discharge planning if the patient needs post-hospital services based on physician/LIP order or complex needs related to functional status, cognitive ability or social support system  Outcome: Progressing

## 2025-05-11 NOTE — PROGRESS NOTES
[facility]  Progress Note    Domi Cordova Patient Status:  Inpatient    1960 MRN [id number]   Location [facility] 4W/SW/SE Attending Alexander Araujo MD   Hosp Day # 2 PCP None Pcp     Subjective:  Patient resting in bed, family at bedside.   used.  Patient reports postoperative abdominal soreness.  Denies passed flatus or bowel but notes belching.  Denies nausea or vomiting.  Ambulating.    Objective/Physical Exam:  General: Alert, orientated x3.  Cooperative.  No apparent distress.  Vital Signs:  Blood pressure 105/57, pulse 82, temperature 98.8 °F (37.1 °C), temperature source Oral, resp. rate 16, height 5' (1.524 m), weight 126 lb (57.2 kg), SpO2 98%, not currently breastfeeding.  Wt Readings from Last 3 Encounters:   05/10/25 126 lb (57.2 kg)   10/16/21 128 lb 11.2 oz (58.4 kg)     Lungs: No respiratory distress.  Cardiac: Regular rate and rhythm.   Abdomen:  Soft, non-distended, expected incisional tenderness, with no rebound or guarding.  No peritoneal signs.   Extremities:  No lower extremity edema noted.    Incision: Clean, dry, intact, no erythema    Intake/Output:    Intake/Output Summary (Last 24 hours) at 2025 1125  Last data filed at 5/10/2025 1700  Gross per 24 hour   Intake --   Output 1300 ml   Net -1300 ml     I/O last 3 completed shifts:  In: 1200 [I.V.:1000; IV PIGGYBACK:200]  Out: 1300 [Urine:1300]  No intake/output data recorded.    Medications: Scheduled Medications[1]    Labs:  Lab Results   Component Value Date    WBC 10.2 2025    HGB 9.0 2025    HCT 26.4 2025    .0 2025     Lab Results   Component Value Date     2025    K 3.7 2025     2025    CO2 26.0 2025    BUN 13 2025    CREATSERUM 0.59 2025     2025    CA 8.6 2025     No results found for: \"PT\", \"INR\"      Assessment  Problem List[2]    POD 1 laparoscopic appendectomy  with perforation of appendix on 5/10/2025  Suspected postoperative ileus    Plan:  Patient is recovering well from surgery, suspected ileus  Continue clear liquid diet until return of bowel function  Analgesics and antiemetics as needed  Ambulate and up to chair  DVT prophylaxis with Lovenox  GI prophylaxis with Pepcid    Quality:  DVT Mechanical Prophylaxis:   SCDs,    DVT Pharmacologic Prophylaxis   Medication    enoxaparin (Lovenox) 40 MG/0.4ML SUBQ injection 40 mg                Code Status: Full Code  Medley: No urinary catheter in place  Medley Duration (in days):   Central line:    ANGÉLICA: 5/12/2025        Charla Marks PA-C  5/11/2025  11:25 AM      The patient was independently examined. Agree with the PA's assessment and plan. Continue clears, encouraged ambulation. Continue at least 4 days antibiotics post-op.       More than 50% of clinical time and 100% MDM was performed by me.     Karey Dhillon MD  [facility] - General Surgery   [address] [place] [address]  p [phone]        [1]    sodium phosphate  15 mmol Intravenous Once    acetaminophen  1,000 mg Oral Q8H    insulin degludec  12 Units Subcutaneous Daily    insulin aspart  1-7 Units Subcutaneous TID CC    enoxaparin  40 mg Subcutaneous Daily    valACYclovir  500 mg Oral Daily    pregabalin  100 mg Oral TID    famotidine  20 mg Intravenous BID    piperacillin-tazobactam  3.375 g Intravenous Q8H   [2]   Patient Active Problem List  Diagnosis    Hyperglycemia    Leukocytosis    Acute appendicitis with localized peritonitis without gangrene, unspecified whether abscess present, unspecified whether perforation present

## 2025-05-12 LAB
ANION GAP SERPL CALC-SCNC: 4 MMOL/L (ref 0–18)
BASOPHILS # BLD AUTO: 0.06 X10(3) UL (ref 0–0.2)
BASOPHILS NFR BLD AUTO: 0.5 %
BUN BLD-MCNC: 8 MG/DL (ref 9–23)
BUN/CREAT SERPL: 13.8 (ref 10–20)
CALCIUM BLD-MCNC: 8.3 MG/DL (ref 8.7–10.4)
CHLORIDE SERPL-SCNC: 106 MMOL/L (ref 98–112)
CO2 SERPL-SCNC: 26 MMOL/L (ref 21–32)
CREAT BLD-MCNC: 0.58 MG/DL (ref 0.55–1.02)
DEPRECATED RDW RBC AUTO: 41.1 FL (ref 35.1–46.3)
EGFRCR SERPLBLD CKD-EPI 2021: 102 ML/MIN/1.73M2 (ref 60–?)
EOSINOPHIL # BLD AUTO: 0.22 X10(3) UL (ref 0–0.7)
EOSINOPHIL NFR BLD AUTO: 1.8 %
ERYTHROCYTE [DISTWIDTH] IN BLOOD BY AUTOMATED COUNT: 12.5 % (ref 11–15)
GLUCOSE BLD-MCNC: 127 MG/DL (ref 70–99)
GLUCOSE BLDC GLUCOMTR-MCNC: 125 MG/DL (ref 70–99)
GLUCOSE BLDC GLUCOMTR-MCNC: 135 MG/DL (ref 70–99)
GLUCOSE BLDC GLUCOMTR-MCNC: 197 MG/DL (ref 70–99)
GLUCOSE BLDC GLUCOMTR-MCNC: 92 MG/DL (ref 70–99)
HCT VFR BLD AUTO: 27.7 % (ref 35–48)
HGB BLD-MCNC: 9.3 G/DL (ref 12–16)
IMM GRANULOCYTES # BLD AUTO: 0.06 X10(3) UL (ref 0–1)
IMM GRANULOCYTES NFR BLD: 0.5 %
LYMPHOCYTES # BLD AUTO: 1.56 X10(3) UL (ref 1–4)
LYMPHOCYTES NFR BLD AUTO: 12.5 %
MAGNESIUM SERPL-MCNC: 1.8 MG/DL (ref 1.6–2.6)
MCH RBC QN AUTO: 30 PG (ref 26–34)
MCHC RBC AUTO-ENTMCNC: 33.6 G/DL (ref 31–37)
MCV RBC AUTO: 89.4 FL (ref 80–100)
MONOCYTES # BLD AUTO: 0.67 X10(3) UL (ref 0.1–1)
MONOCYTES NFR BLD AUTO: 5.4 %
NEUTROPHILS # BLD AUTO: 9.95 X10 (3) UL (ref 1.5–7.7)
NEUTROPHILS # BLD AUTO: 9.95 X10(3) UL (ref 1.5–7.7)
NEUTROPHILS NFR BLD AUTO: 79.3 %
OSMOLALITY SERPL CALC.SUM OF ELEC: 282 MOSM/KG (ref 275–295)
PHOSPHATE SERPL-MCNC: 2.1 MG/DL (ref 2.4–5.1)
PLATELET # BLD AUTO: 249 10(3)UL (ref 150–450)
POTASSIUM SERPL-SCNC: 3.1 MMOL/L (ref 3.5–5.1)
POTASSIUM SERPL-SCNC: 3.4 MMOL/L (ref 3.5–5.1)
RBC # BLD AUTO: 3.1 X10(6)UL (ref 3.8–5.3)
SODIUM SERPL-SCNC: 136 MMOL/L (ref 136–145)
WBC # BLD AUTO: 12.5 X10(3) UL (ref 4–11)

## 2025-05-12 PROCEDURE — 85025 COMPLETE CBC W/AUTO DIFF WBC: CPT | Performed by: INTERNAL MEDICINE

## 2025-05-12 PROCEDURE — 83735 ASSAY OF MAGNESIUM: CPT | Performed by: INTERNAL MEDICINE

## 2025-05-12 PROCEDURE — 84100 ASSAY OF PHOSPHORUS: CPT | Performed by: HOSPITALIST

## 2025-05-12 PROCEDURE — 84132 ASSAY OF SERUM POTASSIUM: CPT | Performed by: INTERNAL MEDICINE

## 2025-05-12 PROCEDURE — 94760 N-INVAS EAR/PLS OXIMETRY 1: CPT

## 2025-05-12 PROCEDURE — 82962 GLUCOSE BLOOD TEST: CPT

## 2025-05-12 PROCEDURE — 80048 BASIC METABOLIC PNL TOTAL CA: CPT | Performed by: INTERNAL MEDICINE

## 2025-05-12 RX ORDER — MAGNESIUM OXIDE 400 MG/1
400 TABLET ORAL ONCE
Status: COMPLETED | OUTPATIENT
Start: 2025-05-12 | End: 2025-05-12

## 2025-05-12 RX ORDER — DOCUSATE SODIUM 100 MG/1
100 CAPSULE, LIQUID FILLED ORAL 2 TIMES DAILY
Status: DISCONTINUED | OUTPATIENT
Start: 2025-05-12 | End: 2025-05-13

## 2025-05-12 RX ORDER — BISACODYL 10 MG
10 SUPPOSITORY, RECTAL RECTAL
Status: DISCONTINUED | OUTPATIENT
Start: 2025-05-12 | End: 2025-05-13

## 2025-05-12 RX ORDER — POLYETHYLENE GLYCOL 3350 17 G/17G
17 POWDER, FOR SOLUTION ORAL DAILY PRN
Status: DISCONTINUED | OUTPATIENT
Start: 2025-05-12 | End: 2025-05-13

## 2025-05-12 RX ORDER — POTASSIUM CHLORIDE 14.9 MG/ML
20 INJECTION INTRAVENOUS ONCE
Status: COMPLETED | OUTPATIENT
Start: 2025-05-12 | End: 2025-05-12

## 2025-05-12 RX ORDER — SODIUM PHOSPHATE, DIBASIC AND SODIUM PHOSPHATE, MONOBASIC 7; 19 G/230ML; G/230ML
1 ENEMA RECTAL ONCE AS NEEDED
Status: DISCONTINUED | OUTPATIENT
Start: 2025-05-12 | End: 2025-05-13

## 2025-05-12 NOTE — PLAN OF CARE
Patient is AxO x4. [place]ian esthelag, family at bedside translating for her.  Continent of bowel and bladder. Patient is ambulating by self. Pain: is managed with PRN oxy and scheduled tylenol.  Receiving IV abx.    Problem: Patient Centered Care  Goal: Patient preferences are identified and integrated in the patient's plan of care  Description: Interventions:- What would you like us to know as we care for you? - Provide timely, complete, and accurate information to patient/family- Incorporate patient and family knowledge, values, beliefs, and cultural backgrounds into the planning and delivery of care- Encourage patient/family to participate in care and decision-making at the level they choose- Honor patient and family perspectives and choices  5/12/2025 1755 by Aggie Joyner RN  Outcome: Progressing  5/12/2025 1753 by Aggie Joyner RN  Outcome: Progressing  5/12/2025 1341 by Aggie Joyner RN  Outcome: Progressing     Problem: PAIN - ADULT  Goal: Verbalizes/displays adequate comfort level or patient's stated pain goal  Description: INTERVENTIONS:- Encourage pt to monitor pain and request assistance- Assess pain using appropriate pain scale- Administer analgesics based on type and severity of pain and evaluate response- Implement non-pharmacological measures as appropriate and evaluate response- Consider cultural and social influences on pain and pain management- Manage/alleviate anxiety- Utilize distraction and/or relaxation techniques- Monitor for opioid side effects- Notify MD/LIP if interventions unsuccessful or patient reports new pain- Anticipate increased pain with activity and pre-medicate as appropriate  5/12/2025 1755 by Aggie Joyner RN  Outcome: Progressing  5/12/2025 1753 by Aggie Joyner RN  Outcome: Progressing  5/12/2025 1341 by Aggie Joyner RN  Outcome: Progressing     Problem: RISK FOR INFECTION - ADULT  Goal: Absence of fever/infection during anticipated neutropenic  period  Description: INTERVENTIONS- Monitor WBC- Administer growth factors as ordered- Implement neutropenic guidelines  5/12/2025 1755 by Aggie Joyner RN  Outcome: Progressing  5/12/2025 1753 by Aggie Joyner RN  Outcome: Progressing  5/12/2025 1341 by Aggie Joyner RN  Outcome: Progressing     Problem: SAFETY ADULT - FALL  Goal: Free from fall injury  Description: INTERVENTIONS:- Assess pt frequently for physical needs- Identify cognitive and physical deficits and behaviors that affect risk of falls.- [place] fall precautions as indicated by assessment.- Educate pt/family on patient safety including physical limitations- Instruct pt to call for assistance with activity based on assessment- Modify environment to reduce risk of injury- Provide assistive devices as appropriate- Consider OT/PT consult to assist with strengthening/mobility- Encourage toileting schedule  5/12/2025 1755 by Aggie Joyner RN  Outcome: Progressing  5/12/2025 1753 by Aggie Joyner RN  Outcome: Progressing  5/12/2025 1341 by Aggie Joyner RN  Outcome: Progressing     Problem: DISCHARGE PLANNING  Goal: Discharge to home or other facility with appropriate resources  Description: INTERVENTIONS:- Identify barriers to discharge w/pt and caregiver- Include patient/family/discharge partner in discharge planning- Arrange for needed discharge resources and transportation as appropriate- Identify discharge learning needs (meds, wound care, etc)- Arrange for interpreters to assist at discharge as needed- Consider post-discharge preferences of patient/family/discharge partner- Complete POLST form as appropriate- Assess patient's ability to be responsible for managing their own health- Refer to Case Management Department for coordinating discharge planning if the patient needs post-hospital services based on physician/LIP order or complex needs related to functional status, cognitive ability or social support system  5/12/2025 1755 by  Aggie Joyner, RN  Outcome: Progressing  5/12/2025 1753 by Aggie Joyner RN  Outcome: Progressing  5/12/2025 1341 by Aggie Joyner, RN  Outcome: Progressing

## 2025-05-12 NOTE — PROGRESS NOTES
Progress Note     Domi Cordova Patient Status:  Inpatient    1960 MRN [id number]   Location [facility] 4W/SW/SE Attending Alexander Araujo MD   Hosp Day # 3 PCP None Pcp     Chief Complaint:   Chief Complaint   Patient presents with    Pelvic Pain    Abdomen/Flank Pain         Subjective:   S: Patient seen and examined, chart reviewed.   Doing well, wants to eat.  Belly sounds present now.  No BM.       Review of Systems:   10 point ROS completed and was negative, except for pertinent positive and negatives stated in subjective.                Objective:   Vital signs:  Temp:  [97.6 °F (36.4 °C)-98.6 °F (37 °C)] 97.7 °F (36.5 °C)  Pulse:  [71-96] 71  Resp:  [16] 16  BP: (102-122)/(52-66) 102/66  SpO2:  [95 %-97 %] 95 %    Wt Readings from Last 6 Encounters:   05/10/25 126 lb (57.2 kg)   10/16/21 128 lb 11.2 oz (58.4 kg)       Physical Exam:    General: No acute distress.   Respiratory: Clear to auscultation bilaterally. No wheezes. No rhonchi.  Cardiovascular: S1, S2. Regular rate and rhythm. No murmurs, rubs or gallops.   Abdomen: Soft, nontender, nondistended.  Positive bowel sounds. No rebound or guarding.  Neurologic: No focal neurological deficits.   Musculoskeletal: Moves all extremities.  Extremities: No edema.    Results:   Diagnostic Data:      Labs:    Labs Last 24 Hours:   BMP     CBC    Other     Na 136 Cl 106 BUN 8 Glu 127   Hb 9.3   PTT - Procal -   K 3.1 CO2 26.0 Cr 0.58   WBC 12.5 >< .0  INR - CRP -   Renal Lytes Endo    Hct 27.7   Trop - D dim -   eGFR - Ca 8.3 POC Gluc  92    LFT   pBNP - Lactic -   eGFR AA - PO4 2.1 A1c -   AST - APk - Prot -  LDL -      Recent Labs   Lab 25  1948 25  0451 25  0425   RBC 4.21 2.92* 3.10*   HGB 12.3 9.0* 9.3*   HCT 37.5 26.4* 27.7*   MCV 89.1 90.4 89.4   MCH 29.2 30.8 30.0   MCHC 32.8 34.1 33.6   RDW 12.5 12.5 12.5   NEPRELIM 15.39* 8.33* 9.95*   WBC 19.2* 10.2 12.5*   .0 199.0 249.0       No results  found for: \"PT\", \"INR\"    Recent Labs   Lab 05/09/25 1948 05/11/25 0451 05/12/25  0425   * 103* 127*   BUN 13 13 8*   CREATSERUM 0.75 0.59 0.58   CA 9.3 8.6* 8.3*   ALB 4.5  --   --     137 136   K 3.6 3.7 3.1*    107 106   CO2 26.0 26.0 26.0   ALKPHO 99  --   --    AST 22  --   --    ALT 18  --   --    BILT 0.8  --   --    TP 7.5  --   --        Recent Labs   Lab 05/09/25 1948   LIP 29       Recent Labs   Lab 05/11/25 0451 05/12/25  0425   MG 1.9 1.8   PHOS 2.5 2.1*       No results for input(s): \"URINE\", \"CULTI\", \"BLDSMR\" in the last 168 hours.      No results found.        Pro-Calcitonin  No results for input(s): \"PCT\" in the last 168 hours.    Cardiac  No results for input(s): \"TROP\", \"PBNP\" in the last 168 hours.    Imaging: Imaging data reviewed in Epic.    No results found.       Medications: Scheduled Medications[1]    Assessment & Plan:   ASSESSMENT / PLAN:     Acute appendicitis complicated by phlegmon versus abscess; perf appy  Pod 2  Post op ileus now resolved  Clears today and advance to reg diet.   Encourage ambulation     Cholelithiasis with no cholecystitis; mild thickening on ct; no pain to palp; lft ok  On abx. LFTs within normal limits, as stated previously surgery consulted, will continue to monitor clinically; us if lft changes or pain     Insulin requiring diabetes  Blood sugar suboptimally controlled, resume home dose of insulin use sliding scale coverage as needed, Hb A1c 7.7%. NOVOLIN CHANGED TO NOVOLOG; PT STATES SHE WILL EAT     Prophylaxis  SCDs, heparin   CODE STATUS  Full        Complex mdm; coordinating care with nurse and counseling pt and with her permission her sister in room about appe; rest; inc jonatan     Ct images reviewed     Estimated date of discharge: 1-3 days  Discharge is dependent on: surgical clear  At this point Ms. Dian Cordova is expected to be discharge to: home     Plan of care discussed with patient    Likely home tomorrow      Alexander  MD Gayle, FAAP, FACP  [facility] Hospitalist  I respond to Epic Chat and AMS Connect         [1]    potassium phosphate dibasic 15 mmol in sodium chloride 0.9% 250 mL IVPB  15 mmol Intravenous Once    Followed by    potassium chloride  20 mEq Intravenous Once    docusate sodium  100 mg Oral BID    acetaminophen  1,000 mg Oral Q8H    insulin degludec  12 Units Subcutaneous Daily    insulin aspart  1-7 Units Subcutaneous TID CC    enoxaparin  40 mg Subcutaneous Daily    valACYclovir  500 mg Oral Daily    pregabalin  100 mg Oral TID    famotidine  20 mg Intravenous BID    piperacillin-tazobactam  3.375 g Intravenous Q8H

## 2025-05-12 NOTE — PROGRESS NOTES
[facility]  Progress Note    Domi Cordova Patient Status:  Inpatient    1960 MRN [id number]   Location [facility] 4W/SW/SE Attending Alexander Araujo MD   Hosp Day # 3 PCP None Pcp     Subjective:  Pt seen laying in bed. Pain controlled, passing scant flatus, no bms yet. Some abdominal distention, no nausea or vomiting. No fever or chills. Ambulating. Feels hungry.    Objective/Physical Exam:  General: Alert, orientated x3.  Cooperative.  No apparent distress.  Vital Signs:  Blood pressure 102/66, pulse 71, temperature 97.7 °F (36.5 °C), temperature source Temporal, resp. rate 16, height 5' (1.524 m), weight 126 lb (57.2 kg), SpO2 95%, not currently breastfeeding.  Wt Readings from Last 3 Encounters:   05/10/25 126 lb (57.2 kg)   10/16/21 128 lb 11.2 oz (58.4 kg)     Lungs: No respiratory distress.  Cardiac: Regular rate and rhythm.   Abdomen:  Soft, mildly distended, expected incisional tender, with no rebound or guarding.  No peritoneal signs.   Extremities:  No lower extremity edema noted.    Incision: Clean, dry, intact, no erythema    Intake/Output:    Intake/Output Summary (Last 24 hours) at 2025 0842  Last data filed at 2025 2116  Gross per 24 hour   Intake 240 ml   Output --   Net 240 ml     I/O last 3 completed shifts:  In: 240 [P.O.:240]  Out: -   No intake/output data recorded.    Medications: Scheduled Medications[1]    Labs:  Lab Results   Component Value Date    WBC 12.5 2025    HGB 9.3 2025    HCT 27.7 2025    .0 2025     Lab Results   Component Value Date     2025    K 3.1 2025     2025    CO2 26.0 2025    BUN 8 2025    CREATSERUM 0.58 2025     2025    CA 8.3 2025     No results found for: \"PT\", \"INR\"      Assessment  Problem List[2]    POD 1 laparoscopic appendectomy with perforation of appendix on 5/10/2025  Suspected postoperative ileus      Plan:  Patient is recovering well from surgery, suspected ileus  Okay to trial full liquids, monitor for worsening nausea or vomiting  Analgesics and antiemetics as needed  Ambulate and up to chair  DVT prophylaxis with Lovenox  GI prophylaxis with Pepcid    Quality:  DVT Mechanical Prophylaxis:   SCDs,    DVT Pharmacologic Prophylaxis   Medication    enoxaparin (Lovenox) 40 MG/0.4ML SUBQ injection 40 mg                Code Status: Full Code  Medley: No urinary catheter in place  Medley Duration (in days):   Central line:    ANGÉLICA: 5/14/2025        Jed Higgins PA-C  5/12/2025  8:42 AM        The patient was independently examined. Agree with the PA's assessment and plan. Advance to soft diet. Continue antibiotics. Anticipate discharge in next 24 h with homegoing antibiotics if leukocytosis doesn't significantly worsen.       More than 50% of clinical time and 100% MDM was performed by me.     Karey Dhillon MD  [facility] - General Surgery   [address] [place] [address]  p [phone]        [1]    potassium phosphate dibasic 15 mmol in sodium chloride 0.9% 250 mL IVPB  15 mmol Intravenous Once    Followed by    potassium chloride  20 mEq Intravenous Once    acetaminophen  1,000 mg Oral Q8H    insulin degludec  12 Units Subcutaneous Daily    insulin aspart  1-7 Units Subcutaneous TID CC    enoxaparin  40 mg Subcutaneous Daily    valACYclovir  500 mg Oral Daily    pregabalin  100 mg Oral TID    famotidine  20 mg Intravenous BID    piperacillin-tazobactam  3.375 g Intravenous Q8H   [2]   Patient Active Problem List  Diagnosis    Hyperglycemia    Leukocytosis    Acute appendicitis with localized peritonitis without gangrene, unspecified whether abscess present, unspecified whether perforation present

## 2025-05-12 NOTE — PLAN OF CARE
Patient is AxO x4. Singaporean speaing, family at bedside translating for her.  Continent of bowel and bladder. Patient is ambulating by self. Pain: is managed with PRN oxy and scheduled tylenol.  Receiving IV abx.  POD #2, 3 incisions from appendectomy. Dermabond and open to air.  Problem: Patient Centered Care  Goal: Patient preferences are identified and integrated in the patient's plan of care  Description: Interventions:- What would you like us to know as we care for you? - Provide timely, complete, and accurate information to patient/family- Incorporate patient and family knowledge, values, beliefs, and cultural backgrounds into the planning and delivery of care- Encourage patient/family to participate in care and decision-making at the level they choose- Honor patient and family perspectives and choices  Outcome: Progressing     Problem: PAIN - ADULT  Goal: Verbalizes/displays adequate comfort level or patient's stated pain goal  Description: INTERVENTIONS:- Encourage pt to monitor pain and request assistance- Assess pain using appropriate pain scale- Administer analgesics based on type and severity of pain and evaluate response- Implement non-pharmacological measures as appropriate and evaluate response- Consider cultural and social influences on pain and pain management- Manage/alleviate anxiety- Utilize distraction and/or relaxation techniques- Monitor for opioid side effects- Notify MD/LIP if interventions unsuccessful or patient reports new pain- Anticipate increased pain with activity and pre-medicate as appropriate  Outcome: Progressing     Problem: RISK FOR INFECTION - ADULT  Goal: Absence of fever/infection during anticipated neutropenic period  Description: INTERVENTIONS- Monitor WBC- Administer growth factors as ordered- Implement neutropenic guidelines  Outcome: Progressing     Problem: SAFETY ADULT - FALL  Goal: Free from fall injury  Description: INTERVENTIONS:- Assess pt frequently for physical  needs- Identify cognitive and physical deficits and behaviors that affect risk of falls.- [place] fall precautions as indicated by assessment.- Educate pt/family on patient safety including physical limitations- Instruct pt to call for assistance with activity based on assessment- Modify environment to reduce risk of injury- Provide assistive devices as appropriate- Consider OT/PT consult to assist with strengthening/mobility- Encourage toileting schedule  Outcome: Progressing     Problem: DISCHARGE PLANNING  Goal: Discharge to home or other facility with appropriate resources  Description: INTERVENTIONS:- Identify barriers to discharge w/pt and caregiver- Include patient/family/discharge partner in discharge planning- Arrange for needed discharge resources and transportation as appropriate- Identify discharge learning needs (meds, wound care, etc)- Arrange for interpreters to assist at discharge as needed- Consider post-discharge preferences of patient/family/discharge partner- Complete POLST form as appropriate- Assess patient's ability to be responsible for managing their own health- Refer to Case Management Department for coordinating discharge planning if the patient needs post-hospital services based on physician/LIP order or complex needs related to functional status, cognitive ability or social support system  Outcome: Progressing

## 2025-05-13 ENCOUNTER — MOBILE ENCOUNTER (OUTPATIENT)
Dept: SURGERY | Facility: CLINIC | Age: 65
End: 2025-05-13

## 2025-05-13 VITALS
OXYGEN SATURATION: 93 % | HEIGHT: 60 IN | HEART RATE: 64 BPM | DIASTOLIC BLOOD PRESSURE: 60 MMHG | BODY MASS INDEX: 24.74 KG/M2 | RESPIRATION RATE: 16 BRPM | WEIGHT: 126 LBS | TEMPERATURE: 100 F | SYSTOLIC BLOOD PRESSURE: 141 MMHG

## 2025-05-13 DIAGNOSIS — Z48.89 ENCOUNTER FOR POSTOPERATIVE CARE: Primary | ICD-10-CM

## 2025-05-13 PROBLEM — D72.829 LEUKOCYTOSIS: Status: RESOLVED | Noted: 2025-05-09 | Resolved: 2025-05-13

## 2025-05-13 PROBLEM — K35.30 ACUTE APPENDICITIS WITH LOCALIZED PERITONITIS WITHOUT GANGRENE, UNSPECIFIED WHETHER ABSCESS PRESENT, UNSPECIFIED WHETHER PERFORATION PRESENT: Status: RESOLVED | Noted: 2025-05-09 | Resolved: 2025-05-13

## 2025-05-13 LAB
BASOPHILS # BLD AUTO: 0.06 X10(3) UL (ref 0–0.2)
BASOPHILS NFR BLD AUTO: 0.5 %
DEPRECATED RDW RBC AUTO: 41.3 FL (ref 35.1–46.3)
EOSINOPHIL # BLD AUTO: 0.3 X10(3) UL (ref 0–0.7)
EOSINOPHIL NFR BLD AUTO: 2.3 %
ERYTHROCYTE [DISTWIDTH] IN BLOOD BY AUTOMATED COUNT: 13 % (ref 11–15)
GLUCOSE BLDC GLUCOMTR-MCNC: 153 MG/DL (ref 70–99)
GLUCOSE BLDC GLUCOMTR-MCNC: 96 MG/DL (ref 70–99)
HCT VFR BLD AUTO: 28.4 % (ref 35–48)
HGB BLD-MCNC: 9.5 G/DL (ref 12–16)
IMM GRANULOCYTES # BLD AUTO: 0.16 X10(3) UL (ref 0–1)
IMM GRANULOCYTES NFR BLD: 1.2 %
LYMPHOCYTES # BLD AUTO: 1.65 X10(3) UL (ref 1–4)
LYMPHOCYTES NFR BLD AUTO: 12.7 %
MAGNESIUM SERPL-MCNC: 1.9 MG/DL (ref 1.6–2.6)
MCH RBC QN AUTO: 29.2 PG (ref 26–34)
MCHC RBC AUTO-ENTMCNC: 33.5 G/DL (ref 31–37)
MCV RBC AUTO: 87.4 FL (ref 80–100)
MONOCYTES # BLD AUTO: 0.94 X10(3) UL (ref 0.1–1)
MONOCYTES NFR BLD AUTO: 7.2 %
NEUTROPHILS # BLD AUTO: 9.89 X10 (3) UL (ref 1.5–7.7)
NEUTROPHILS # BLD AUTO: 9.89 X10(3) UL (ref 1.5–7.7)
NEUTROPHILS NFR BLD AUTO: 76.1 %
PHOSPHATE SERPL-MCNC: 2.8 MG/DL (ref 2.4–5.1)
PLATELET # BLD AUTO: 280 10(3)UL (ref 150–450)
RBC # BLD AUTO: 3.25 X10(6)UL (ref 3.8–5.3)
WBC # BLD AUTO: 13 X10(3) UL (ref 4–11)

## 2025-05-13 PROCEDURE — 83735 ASSAY OF MAGNESIUM: CPT | Performed by: INTERNAL MEDICINE

## 2025-05-13 PROCEDURE — 94760 N-INVAS EAR/PLS OXIMETRY 1: CPT

## 2025-05-13 PROCEDURE — 84100 ASSAY OF PHOSPHORUS: CPT | Performed by: INTERNAL MEDICINE

## 2025-05-13 PROCEDURE — 85025 COMPLETE CBC W/AUTO DIFF WBC: CPT | Performed by: INTERNAL MEDICINE

## 2025-05-13 PROCEDURE — 82962 GLUCOSE BLOOD TEST: CPT

## 2025-05-13 RX ORDER — POTASSIUM CHLORIDE 1500 MG/1
40 TABLET, EXTENDED RELEASE ORAL ONCE
Status: COMPLETED | OUTPATIENT
Start: 2025-05-13 | End: 2025-05-13

## 2025-05-13 NOTE — PROGRESS NOTES
[facility]  Progress Note    Domi Cordova Patient Status:  Inpatient    1960 MRN [id number]   Location [facility] 4W/SW/SE Attending Alexander Araujo MD   Hosp Day # 4 PCP None Pcp     Subjective:  Pt seen laying in bed. Feeling well, anxious to have a bowel movement. Passing some flatus, no nausea or vomiting. Tolerating diet. Pain controlled.    Objective/Physical Exam:  General: Alert, orientated x3.  Cooperative.  No apparent distress.  Vital Signs:  Blood pressure 141/60, pulse 64, temperature 99.5 °F (37.5 °C), temperature source Oral, resp. rate 16, height 5' (1.524 m), weight 126 lb (57.2 kg), SpO2 93%, not currently breastfeeding.  Wt Readings from Last 3 Encounters:   05/10/25 126 lb (57.2 kg)   10/16/21 128 lb 11.2 oz (58.4 kg)     Lungs: No respiratory distress.  Cardiac: Regular rate and rhythm.   Abdomen:  Soft, non distended, expected incisional tender, with no rebound or guarding.  No peritoneal signs.   Extremities:  No lower extremity edema noted.    Incision: Clean, dry, intact, no erythema    Intake/Output:    Intake/Output Summary (Last 24 hours) at 2025 0831  Last data filed at 2025 0849  Gross per 24 hour   Intake 490 ml   Output --   Net 490 ml     I/O last 3 completed shifts:  In: 730 [P.O.:730]  Out: -   No intake/output data recorded.    Medications: Scheduled Medications[1]    Labs:  Lab Results   Component Value Date    WBC 13.0 2025    HGB 9.5 2025    HCT 28.4 2025    .0 2025     Lab Results   Component Value Date    K 3.4 2025     No results found for: \"PT\", \"INR\"      Assessment  Problem List[2]    POD 3 laparoscopic appendectomy with perforation of appendix on 5/10/2025  Suspected postoperative ileus     Plan:  Patient is recovering well from surgery, suspected ileus  Tolerating soft diet without nausea or vomiting  Continue bowel regimen until patient has a bowel movement  Analgesics and  antiemetics as needed  Ambulate and up to chair  DVT prophylaxis with Lovenox  GI prophylaxis with Pepcid       Quality:  DVT Mechanical Prophylaxis:   SCDs,    DVT Pharmacologic Prophylaxis   Medication    enoxaparin (Lovenox) 40 MG/0.4ML SUBQ injection 40 mg                Code Status: Full Code  Medley: No urinary catheter in place  Medley Duration (in days):   Central line:    ANGÉLICA: 5/13/2025        Jed Higgins PA-C  5/13/2025  8:31 AM               [1]    potassium chloride  40 mEq Oral Once    docusate sodium  100 mg Oral BID    acetaminophen  1,000 mg Oral Q8H    insulin degludec  12 Units Subcutaneous Daily    insulin aspart  1-7 Units Subcutaneous TID CC    enoxaparin  40 mg Subcutaneous Daily    valACYclovir  500 mg Oral Daily    pregabalin  100 mg Oral TID    famotidine  20 mg Intravenous BID    piperacillin-tazobactam  3.375 g Intravenous Q8H   [2]   Patient Active Problem List  Diagnosis    Hyperglycemia    Leukocytosis    Acute appendicitis with localized peritonitis without gangrene, unspecified whether abscess present, unspecified whether perforation present

## 2025-05-13 NOTE — DISCHARGE SUMMARY
[facility]  part of [facility]    Discharge Summary    Domi Cordova Patient Status:  Inpatient    1960 MRN [id number]   Location [facility] 4W/SW/SE Attending Alexander Araujo MD   Hosp Day # 4 PCP None Pcp     Date of Admission: 2025 Disposition: Home or Self Care     Date of Discharge: 2025    Admitting Diagnosis: Acute appendicitis with localized peritonitis without gangrene, unspecified whether abscess present, unspecified whether perforation present [K35.30]    Hospital Discharge Diagnoses:   Acute appendicitis with rupture  Acute appendicitis complicated by phlegmon   Cholelithiasis with no cholecystitis;   Insulin requiring diabetes       Lace+ Score: 48  59-90 High Risk  29-58 Medium Risk  0-28   Low Risk.    TCM Follow-Up Recommendation:  LACE 29-58: Moderate Risk of readmission after discharge from the hospital.      Problem List: Problem List[1]    Reason for Admission: abdominal pain    Physical Exam:   General appearance: alert, appears stated age and cooperative  Pulmonary:  clear to auscultation bilaterally  Cardiovascular: S1, S2 normal, no murmur, click, rub or gallop, regular rate and rhythm  Abdominal: soft, non-tender; bowel sounds normal; no masses,  no organomegaly  Extremities: extremities normal, atraumatic, no cyanosis or edema  Psychiatric: calm      History of Present Illness: Domi Cordova is a(n) 64 year old female, with a past medical history significant for diabetes presents with a complaint of right lower quadrant abdominal pain that started rather abruptly 2 days ago rates her pain as a 10 out of 10 at its worst aggravated by movement and touch with no relieving factors.  She describes it as sharp stabbing in nature, at times takes her breath away.  Denies any associated nausea or vomiting denies any diarrhea or constipation.  Denies any fever or chills.  CT scan done in ER shows evidence of acute appendicitis  with likely abscess formation    Hospital Course: Acute appendicitis complicated by phlegmon versus abscess; perf appy  Pod 2  Post op ileus now resolved  Clears today and advance to reg diet.   Encourage ambulation     Cholelithiasis with no cholecystitis; mild thickening on ct; no pain to palp; lft ok  On abx. LFTs within normal limits, as stated previously surgery consulted, will continue to monitor clinically; us if lft changes or pain     Insulin requiring diabetes  Blood sugar suboptimally controlled, resume home dose of insulin use sliding scale coverage as needed, Hb A1c 7.7%.    Consultations: surgery    Procedures: lap appy    Complications: post op ileus    Discharge Condition: Good    Discharge Medications:      Discharge Medications        START taking these medications        Instructions Prescription details   Ibuprofen 200 MG Caps  Commonly known as: Advil      Take 2 capsules (400 mg total) by mouth every 6 (six) hours as needed.   Stop taking on: June 9, 2025  Quantity: 40 capsule  Refills: 0     oxyCODONE-acetaminophen 5-325 MG Tabs  Commonly known as: Percocet      Take 1-2 tablets by mouth every 4 (four) hours as needed.   Quantity: 5 tablet  Refills: 0            CONTINUE taking these medications        Instructions Prescription details   azithromycin 250 MG Tabs  Commonly known as: Zithromax      Take 1 tablet (250 mg total) by mouth daily.   Quantity: 3 tablet  Refills: 0     glipiZIDE 5 MG Tabs  Commonly known as: Glucotrol      Take 1 tablet (5 mg total) by mouth every morning.   Refills: 0     insulin NPH & regular 70/30 (70-30) 100 Units/mL Susp  Commonly known as: NovoLIN 70/30      Inject 30 Units into the skin 2 (two) times daily before meals.   Quantity: 10 mL  Refills: 0     metFORMIN 500 MG Tabs  Commonly known as: Glucophage      Take 1 tablet (500 mg total) by mouth 2 (two) times daily.   Refills: 0     pregabalin 100 MG Caps  Commonly known as: Lyrica      Take 1 capsule (100 mg  total) by mouth in the morning, at noon, and at bedtime.   Refills: 0     valACYclovir 500 MG Tabs  Commonly known as: Valtrex      Take 1 tablet (500 mg total) by mouth daily.   Refills: 0               Where to Get Your Medications        These medications were sent to [id number] - [place], [place] - [address] AT [facility], [phone], [phone]  [address] [facility] [address]      Phone: [phone]   Ibuprofen 200 MG Caps  oxyCODONE-acetaminophen 5-325 MG Tabs         Follow up Visits: Follow-up with  in 1 week    Follow up Labs:      Other Discharge Instructions:     Alexander Araujo MD  5/13/2025  1:29 PM    > 35 min        [1]   Patient Active Problem List  Diagnosis    Hyperglycemia

## 2025-05-13 NOTE — PLAN OF CARE
Patient is alert and oriented x4. Continent for both. Is able to ambulate by self. POD #3.   Tolerating soft diet.  Patient discharged per Md's orders.  Problem: Patient Centered Care  Goal: Patient preferences are identified and integrated in the patient's plan of care  Description: Interventions:- What would you like us to know as we care for you? Lives with family- Provide timely, complete, and accurate information to patient/family- Incorporate patient and family knowledge, values, beliefs, and cultural backgrounds into the planning and delivery of care- Encourage patient/family to participate in care and decision-making at the level they choose- Honor patient and family perspectives and choices  Outcome: Adequate for Discharge     Problem: PAIN - ADULT  Goal: Verbalizes/displays adequate comfort level or patient's stated pain goal  Description: INTERVENTIONS:- Encourage pt to monitor pain and request assistance- Assess pain using appropriate pain scale- Administer analgesics based on type and severity of pain and evaluate response- Implement non-pharmacological measures as appropriate and evaluate response- Consider cultural and social influences on pain and pain management- Manage/alleviate anxiety- Utilize distraction and/or relaxation techniques- Monitor for opioid side effects- Notify MD/LIP if interventions unsuccessful or patient reports new pain- Anticipate increased pain with activity and pre-medicate as appropriate  Outcome: Adequate for Discharge     Problem: RISK FOR INFECTION - ADULT  Goal: Absence of fever/infection during anticipated neutropenic period  Description: INTERVENTIONS- Monitor WBC- Administer growth factors as ordered- Implement neutropenic guidelines  Outcome: Adequate for Discharge     Problem: SAFETY ADULT - FALL  Goal: Free from fall injury  Description: INTERVENTIONS:- Assess pt frequently for physical needs- Identify cognitive and physical deficits and behaviors that affect risk  of falls.- [place] fall precautions as indicated by assessment.- Educate pt/family on patient safety including physical limitations- Instruct pt to call for assistance with activity based on assessment- Modify environment to reduce risk of injury- Provide assistive devices as appropriate- Consider OT/PT consult to assist with strengthening/mobility- Encourage toileting schedule  Outcome: Adequate for Discharge     Problem: DISCHARGE PLANNING  Goal: Discharge to home or other facility with appropriate resources  Description: INTERVENTIONS:- Identify barriers to discharge w/pt and caregiver- Include patient/family/discharge partner in discharge planning- Arrange for needed discharge resources and transportation as appropriate- Identify discharge learning needs (meds, wound care, etc)- Arrange for interpreters to assist at discharge as needed- Consider post-discharge preferences of patient/family/discharge partner- Complete POLST form as appropriate- Assess patient's ability to be responsible for managing their own health- Refer to Case Management Department for coordinating discharge planning if the patient needs post-hospital services based on physician/LIP order or complex needs related to functional status, cognitive ability or social support system  Outcome: Adequate for Discharge

## 2025-05-14 ENCOUNTER — TELEPHONE (OUTPATIENT)
Dept: SURGERY | Facility: CLINIC | Age: 65
End: 2025-05-14

## 2025-05-14 ENCOUNTER — PATIENT OUTREACH (OUTPATIENT)
Dept: CASE MANAGEMENT | Age: 65
End: 2025-05-14

## 2025-05-14 NOTE — TELEPHONE ENCOUNTER
Per daughter the direction on both prescriptions that were sent 5/10 were incorrect and the pharmacy needs them resubmit both with correct directions. Please advise

## 2025-05-14 NOTE — PROGRESS NOTES
Left message on mailbox for patient to call care manager back for transitional care management/hospital follow-up call.  Care  information included in message.    1st attempt     Breath sounds clear and equal bilaterally.

## 2025-05-15 RX ORDER — OXYCODONE AND ACETAMINOPHEN 5; 325 MG/1; MG/1
1 TABLET ORAL EVERY 4 HOURS PRN
Qty: 5 TABLET | Refills: 0 | Status: SHIPPED | OUTPATIENT
Start: 2025-05-15

## 2025-05-15 RX ORDER — OMEGA-3 FATTY ACIDS/FISH OIL 300-1000MG
400 CAPSULE ORAL EVERY 6 HOURS PRN
Qty: 40 CAPSULE | Refills: 0 | Status: SHIPPED | OUTPATIENT
Start: 2025-05-15 | End: 2025-06-14

## 2025-05-15 NOTE — PROGRESS NOTES
Transitional Care Management   Discharge Date: 25  Contact Date: 2025    Assessment:  (Insert appropriate Smartphrase below after completing flowsheet)  TCM Initial Assessment    General:  Assessment completed with: Family  Patient Subjective: The patient daughter reports that the patient is having a hard time filling her abx prescription.  Chief Complaint: Leukocytosis Resolved   Acute appendicitis with localized peritonitis without gangrene, unspecified whether abscess present, unspecified whether perforation present Resolved  Verify patient name and  with patient/ caregiver: Yes    Hospital Stay/Discharge:  Tell me what you understand of why you were in the hospital or emergency department: Appendicitis  Prior to leaving the hospital were your Discharge Instructions reviewed with you?: Yes  Did you receive a copy of your written Discharge Instructions?: Yes  What questions do you have about your Discharge Instructions?: None  Do you feel better or worse since you left the hospital or emergency department?: Better    Follow - Up Appointment:  Do you have a follow-up appointment?: No  Are there any barriers to getting to your follow-up appointment?: No    Home Health/DME:  Prior to leaving the hospital was Home Health (HH) arranged for you?: No     Prior to leaving the hospital or emergency department was Durable Medical Equipment (DME), medical supplies, or infusions arranged for you?: No  Are DME/medical supply/infusions needs identified by staff during this assessment?: No     Medications/Diet:       Were you given a different diet per your Discharge Instructions?: No     Questions/Concerns:  Do you have any questions or concerns that have not been discussed?: No        Follow-up Appointments:  Your appointments       Date & Time Appointment Department (Center)    May 16, 2025 9:00 AM [facility] Follow Up with Claire Downing APRN Transitional [facility] Clinic ([facility])               Transitional Care Clinic  [facility]  [address] [place]  [facility] [id number] [phone]          Transitional Care Clinic  Was TCC Ordered: No  Was TCC Scheduled: Yes   - If yes: []  Advised patient to bring all medications and blood glucose meter/supplies if applicable.    Primary Care Provider (If no TCC appointment)  Does patient already have a PCP appointment scheduled? No  Care Manager Scheduled PCP office HFU appointment with patient   -If no appointment scheduled: The patient does not have a PCP. States she is experiencing a difficult time obtaining her antibiotic prescriptions. They have called the ED several times. The patient daughter Christine will reach out to the surgeons office as soon as we disconnect this call to notify them about the abx prescription.    Specialist  Does the patient have any other follow-up appointment(s) that need to be scheduled? Yes   -If yes: Care Manager reviewed upcoming specialist appointments with patient: Yes   -Does the patient need assistance scheduling appointment(s): No      Book By Date: 5/27/25

## 2025-05-15 NOTE — TELEPHONE ENCOUNTER
Per patients daughter states patient has already missed one dose of medication, concerned for patient's infection, does not want to take patient back to hospital. Please call thank you.

## 2025-05-15 NOTE — TELEPHONE ENCOUNTER
Per daughter stating she still waiting or a call back in regard to patient medications that were sent on 5/10 incorrect .Please advise.Patient needs correct medications.

## 2025-05-15 NOTE — TELEPHONE ENCOUNTER
Spoke with patient's daughter, advising her Dr. Dhillon corrected the prescription for the patient.

## 2025-05-16 ENCOUNTER — OFFICE VISIT (OUTPATIENT)
Dept: INTERNAL MEDICINE CLINIC | Facility: CLINIC | Age: 65
End: 2025-05-16

## 2025-05-16 VITALS
SYSTOLIC BLOOD PRESSURE: 111 MMHG | HEIGHT: 60 IN | WEIGHT: 120 LBS | HEART RATE: 88 BPM | BODY MASS INDEX: 23.56 KG/M2 | DIASTOLIC BLOOD PRESSURE: 40 MMHG | TEMPERATURE: 98 F | OXYGEN SATURATION: 100 % | RESPIRATION RATE: 16 BRPM

## 2025-05-16 DIAGNOSIS — R10.9 ACUTE ABDOMINAL PAIN: Primary | ICD-10-CM

## 2025-05-16 DIAGNOSIS — F17.200 TOBACCO DEPENDENCE: ICD-10-CM

## 2025-05-16 DIAGNOSIS — E87.6 HYPOKALEMIA: ICD-10-CM

## 2025-05-16 DIAGNOSIS — E11.9 TYPE 2 DIABETES MELLITUS WITHOUT COMPLICATION, WITHOUT LONG-TERM CURRENT USE OF INSULIN (HCC): ICD-10-CM

## 2025-05-16 DIAGNOSIS — K35.30 ACUTE APPENDICITIS WITH LOCALIZED PERITONITIS WITHOUT GANGRENE, UNSPECIFIED WHETHER ABSCESS PRESENT, UNSPECIFIED WHETHER PERFORATION PRESENT: ICD-10-CM

## 2025-05-16 DIAGNOSIS — R73.9 HYPERGLYCEMIA: ICD-10-CM

## 2025-05-16 DIAGNOSIS — K59.03 DRUG-INDUCED CONSTIPATION: ICD-10-CM

## 2025-05-16 DIAGNOSIS — Z90.49 S/P LAPAROSCOPIC APPENDECTOMY: ICD-10-CM

## 2025-05-16 RX ORDER — BLOOD-GLUCOSE METER
1 EACH MISCELLANEOUS DAILY
Qty: 100 EACH | Refills: 0 | Status: SHIPPED | OUTPATIENT
Start: 2025-05-16

## 2025-05-16 RX ORDER — LANCETS
1 EACH MISCELLANEOUS DAILY
Qty: 100 EACH | Refills: 0 | Status: SHIPPED | OUTPATIENT
Start: 2025-05-16

## 2025-05-16 RX ORDER — IBUPROFEN 600 MG/1
600 TABLET, FILM COATED ORAL EVERY 6 HOURS PRN
Qty: 30 TABLET | Refills: 0 | Status: SHIPPED | OUTPATIENT
Start: 2025-05-16

## 2025-05-16 RX ORDER — BLOOD-GLUCOSE METER
1 EACH MISCELLANEOUS DAILY
Qty: 1 EACH | Refills: 0 | Status: SHIPPED | OUTPATIENT
Start: 2025-05-16

## 2025-05-16 NOTE — PROGRESS NOTES
TCM VISIT  [facility] TRANSITIONAL CARE CLINIC      HISTORY   HPI: Domi Cordova is a 64 year old female here today for hospital follow up for acute abdominal pain, acute appendicitis with localized peritonitis without gangrene, s/p laparoscopic appendectomy, DMII, drug-induced constipation, hypokalemia, tobacco dependence.  Domi Cordova was discharged from [facility]  to Home or Self Care.  Admit Date: 5/9/25. Discharge Date: 5/13/25.     Hospital Discharge Diagnosis:      Acute appendicitis with rupture  Acute appendicitis complicated by phlegmon   Cholelithiasis with no cholecystitis;   Insulin requiring diabetes    Hospital stay was complicated by phlegmon versus abscess.  Domi Cordova was discharge with ibuprofen, Augmentin, Tylenol, oxycodone, stop Zithromycin, postop recommendations/restrictions and a referral to the [facility] for continued follow up.      Today, patient is being seen for hospital follow-up.  She reports still having abdominal discomfort and has been constipated since discharge.  She is accompanied by her daughter at time of exam.  Declined use of language line  and requested daughter to interpret throughout exam.    She presented to ED with right lower quadrant abdominal pain that started rather abruptly 2 days prior to admission and rates her pain as a 10/10 at its worst which is aggravated by movement and touch with no relieving factors.  She described the pain as sharp, stabbing in nature and at times takes her breath away.  Denied any associated nausea or vomiting.  CT scan done in ED with evidence of acute appendicitis with likely abscess formation.  She was diagnosed with acute appendicitis complicated by phlegmon versus abscess with perforated appendix.  She was seen by general surgery and underwent laparoscopic appendectomy.  She developed postop ileus which resolved and was able to tolerate a diet.  She was tolerating a diet, pain was  controlled on oral medication, and was up ambulating in halls.  She was treated with IV antibiotics and transitioned to oral antibiotics at discharge.  She was cleared for discharge and discharged home in stable condition.    Interactive contact within 2 business days post discharge first initiated on Date: 5/14/2025      Allergies:  Allergies[1]   Current Meds:  Current Outpatient Medications   Medication Sig Dispense Refill    oxyCODONE-acetaminophen 5-325 MG Oral Tab Take 1 tablet by mouth every 4 (four) hours as needed. 5 tablet 0    Ibuprofen (ADVIL) 200 MG Oral Cap Take 2 capsules (400 mg total) by mouth every 6 (six) hours as needed. 40 capsule 0    amoxicillin clavulanate 875-125 MG Oral Tab Take 1 tablet by mouth 2 (two) times daily for 1 day. 2 tablet 0    glipiZIDE 5 MG Oral Tab Take 1 tablet (5 mg total) by mouth every morning.      metFORMIN 500 MG Oral Tab Take 1 tablet (500 mg total) by mouth 2 (two) times daily.      valACYclovir 500 MG Oral Tab Take 1 tablet (500 mg total) by mouth daily.      pregabalin 100 MG Oral Cap Take 1 capsule (100 mg total) by mouth in the morning, at noon, and at bedtime.       No current facility-administered medications for this visit.       HISTORY: reconciled and reviewed with patient  Past Medical History[2]   Past Surgical History[3]   Family History[4]   Short Social Hx on File[5]     Imaging & Consults:  CT ABDOMEN+PELVIS(CONTRAST ONLY)(CPT=74177)  Result Date: 5/9/2025  CONCLUSION:   1. Acute appendicitis with probable appendicolith at the base.  Extensive surrounding inflammatory changes are noted, possibly phlegmon or developing abscess.  However, there is no definite evidence of rupture.   2. Cholelithiasis with mild gallbladder wall thickening.  Consider further evaluation with right upper quadrant ultrasound when clinically able.  3. Additional chronic or incidental findings are described in the body of this report.    [id number]    Dictated by (CST): Nanci  Shankar NICOLE MD on 5/09/2025 at 10:07 PM     Finalized by (CST): Shankar Christine MD on 5/09/2025 at 10:10 PM            Lab Results   Component Value Date/Time    WBC 13.0 (H) 05/13/2025 05:21 AM    HGB 9.5 (L) 05/13/2025 05:21 AM    HCT 28.4 (L) 05/13/2025 05:21 AM    .0 05/13/2025 05:21 AM     (H) 05/12/2025 04:25 AM     05/12/2025 04:25 AM    K 3.4 (L) 05/12/2025 05:41 PM     05/12/2025 04:25 AM    CO2 26.0 05/12/2025 04:25 AM    BUN 8 (L) 05/12/2025 04:25 AM    CREATSERUM 0.58 05/12/2025 04:25 AM    CA 8.3 (L) 05/12/2025 04:25 AM    MG 1.9 05/13/2025 05:21 AM    PHOS 2.8 05/13/2025 05:21 AM    ALB 4.5 05/09/2025 07:48 PM    ALT 18 05/09/2025 07:48 PM    AST 22 05/09/2025 07:48 PM    A1C 7.7 (H) 05/09/2025 07:48 PM       Immunization History   Administered Date(s) Administered    FLULAVAL 6 months & older 0.5 ml Prefilled syringe ([id number]) 10/18/2021       ROS:  GENERAL: energy fair/stable, denies fever, weakness  SKIN: denies any skin changes, + lap sites x 3 to abdomen with glue closure D/I and ELMA  EYES: denies blurred vision, eye pain  HEENT: denies ear pain, loss of hearing, sore throat  RESPIRATORY: denies cough, denies dyspnea with exertion  CARDIOVASCULAR: denies syncope, chest pain, fatigue, palpitations   GI: + Pain in BL lower quadrants/incisional abdominal pain, denies melena, + constipation, denies diarrhea, nausea, vomiting   MUSCULOSKELETAL: denies pain, states normal range of motion of extremities  NEUROLOGIC: denies confusion, balance difficulty  PSYCHIATRIC: denies depression or anxiety  HEMATOLOGIC: + anemia, denies bruising, bleeding    PHYSICAL EXAM:  Vitals:    05/16/25 0915   BP: 111/40   Pulse: 88   Resp: 16   Temp: 97.8 °F (36.6 °C)       GENERAL: well developed, well nourished, in no apparent distress, fair/good historian  INTEGUMENTARY: warm, dry, no rashes, + lap sites x 3 to abdomen with glue closure D/I and ELMA  HEENT: atraumatic, normocephalic, sclera white,  conjunctivae pink  NECK: supple  CHEST: no chest tenderness  LUNGS: clear to auscultation bilaterally, no wheezes, rhonchi or rales, normal respiratory effort  CARDIO: S1, S2, RRR without audible murmur  GI: + BS to all quadrants, no tenderness  MUSCULOSKELETAL: + ROM in all joints, no evidence of swelling, pain   EXTREMITIES: no cyanosis, or edema  NEURO: Oriented x3  PSYCHIATRIC: appropriate affect    ASSESSMENT/ PLAN:   1. Acute abdominal pain/acute appendicitis with localized peritonitis without gangrene, with possible abscess/s/p laparoscopic appendectomy  Imaging concerning for acute appendicitis with likely abscess formation  Underwent laparoscopic appendectomy by general surgery without complications  Did have postop ileus that resolved  Reports having abdominal pain in BL lower quadrants as well as ongoing incisional pain-likely most pain related to severe constipation  Pain is rated 10/10 before medications and goes down to 7/10 after medications  Continue as needed for pain  Percocet 5/325 mg 1-2 tabs every 4 hours as needed-has taken 2 doses since discharge  Discussed to only use Percocet when pain is greater then 7/10 due to severe constipation  Alternate  Tylenol 1 g every 6-8 hours as needed with  Rx sent for/start  ibuprofen 600 MG Oral Tab; Take 1 tablet (600 mg total) by mouth every 6 (six) hours as needed for Pain.  Dispense: 30 tablet; Refill: 0  Printed pain medicine schedule given to patient  Lap sites x 3 to abdomen with glue closure D/I and ELMA  Recommended  Incentive spirometer 5-10 times per hour WA to prevent postoperative complications  Up walking 5-10 minutes every 30-60 minutes WA to prevent postoperative complications  Abdominal binder placed for comfort-provided significant comfort for patient  While sitting do foot pumps and ankle circles  Discharged home on  Augmentin 875-125 mg 1 tab 2 times daily-has 2 more tabs  Postop recommendations/restrictions discussed  Tolerating an oral  diet  Appetite is okay-eating small things throughout the day/drinking approximately 32 ounces of fluid per day-increase to 48 ounces per day to help with constipation  Constipated-last bowel movement was prior to surgery/passing gas  Follow-up with general surgery PA on 5/27 at 9:45 AM  Needs to establish with PCP-Medicaid pending-[facility] liaison to contact patient to help establish with PCP  Discussed if Medicaid approved she can call [facility] office and they will assist with helping her to establish with [facility] PCP if able  All pertinent hospital records, labs, radiology from current hospitalization reviewed    2. Type 2 diabetes mellitus without complication, without long-term current use of insulin ([facility])  A1c slightly elevated at 7.7%  Currently not checking BS and does not have BG monitoring supplies at home  Provided information on purchasing glucometer with supplies from [employer] with approximate cost and they agreed  Rx sent for:   Blood Glucose Monitoring Suppl (RELION PREMIER CLASSIC) Does not apply Device; 1 each daily.  Dispense: 1 each; Refill: 0  Glucose Blood (RELION PREMIER TEST) In Vitro Strip; 1 each by Other route daily.  Dispense: 100 each; Refill: 0  ReliOn Lancets Ultra-Thin 30G Does not apply Misc; 1 each daily.  Dispense: 100 each; Refill: 0  ReliOn Alcohol Swabs Does not apply Pads; Apply 1 each topically daily. Clean area prior to testing blood glucose  Dispense: 100 each; Refill: 0  Start checking BS daily and morning fasting and keep log to bring with to all follow-ups for review  Continue  Glipizide 5 mg daily  Metformin 500 mg 2 times daily  Not currently on statin-defer to PCP  Not currently on ACE/ARB-defer to PCP  Continue to monitor for S/S of hyper hypoglycemia  Further A1c monitoring per PCP    3. Drug-induced constipation  Reports last bowel movement was prior to surgery  Is having a lot of abdominal pain likely secondary to constipation as well as incisions  Per daughter  patient had small amount of bowel incontinence on the way to this office visit  During visit patient requested to use the restroom-reported having a very large bowel movement and had some improvement in discomfort  Recommended  MiraLAX 17 g in 8 ounces of fluid daily until having 2 consecutive days with bowel movements then can use as needed  Stressed to avoid opioid pain medication if possible  Notify [facility] clinic on Monday if she is continuing to move her bowels daily with MiraLAX  Discussed goal is to have bowel movement every 1-3 days and prevent any further constipation    4. Hypokalemia  Resolved  Replaced per electrolyte protocol  Potassium prior to discharge stable at 3.4  Continue to monitor labs as directed    5. Tobacco dependence  Smoking cessation discussed  Patient denies any smoking since discharge  Reports was smoking 2-3 cigarettes/day prior to admission  Not currently using any nicotine replacement therapy  Encouraged ongoing cessation      Orders Placed This Encounter    Blood Glucose Monitoring Suppl (RELION PREMIER CLASSIC) Does not apply Device     Si each daily.     Dispense:  1 each     Refill:  0    Glucose Blood (RELION PREMIER TEST) In Vitro Strip     Si each by Other route daily.     Dispense:  100 each     Refill:  0    ReliOn Lancets Ultra-Thin 30G Does not apply Misc     Si each daily.     Dispense:  100 each     Refill:  0    ReliOn Alcohol Swabs Does not apply Pads     Sig: Apply 1 each topically daily. Clean area prior to testing blood glucose     Dispense:  100 each     Refill:  0    ibuprofen 600 MG Oral Tab     Sig: Take 1 tablet (600 mg total) by mouth every 6 (six) hours as needed for Pain.     Dispense:  30 tablet     Refill:  0           Medications & Refills for this Visit:  Current Medications[6]  Requested Prescriptions     Signed Prescriptions Disp Refills    Blood Glucose Monitoring Suppl (RELION PREMIER CLASSIC) Does not apply Device 1 each 0     Si each  daily.    Glucose Blood (RELION PREMIER TEST) In Vitro Strip 100 each 0     Si each by Other route daily.    ReliOn Lancets Ultra-Thin 30G Does not apply Misc 100 each 0     Si each daily.    ReliOn Alcohol Swabs Does not apply Pads 100 each 0     Sig: Apply 1 each topically daily. Clean area prior to testing blood glucose    ibuprofen 600 MG Oral Tab 30 tablet 0     Sig: Take 1 tablet (600 mg total) by mouth every 6 (six) hours as needed for Pain.         Health Maintenance:  Health Maintenance   Topic Date Due    Annual Physical  Never done    Colorectal Cancer Screening  Never done    Mammogram  Never done    Pneumococcal Vaccine: 50+ Years (1 of 2 - PCV) Never done    DTaP,Tdap,and Td Vaccines (1 - Tdap) Never done    Pap Smear  Never done    Zoster Vaccines (1 of 2) Never done    COVID-19 Vaccine ( -  season) Never done    Tobacco Cessation Counseling  Never done    Influenza Vaccine (Season Ended) 10/01/2025    Annual Depression Screening  Completed    Meningococcal B Vaccine  Aged Out       Chronic Care Management Referral: N/A      Transitional Care Management Certification:  During the visit, I accessed [employer] and/or [employer] Everywhere and personally reviewed the following for the recent hospitalization: provider notes, consults, summaries, labs and other test results and the pertinent findings are documented below.     Medication Reconciliation:  I am aware of an inpatient discharge within the last 30 days.  The discharge medication list has been reconciled with the patient's current medication list and reviewed by me.  See medication list for additions of new medication, and changes to current doses of medications and discontinued medications.        Discharge Disposition/Plan:     Future Appointments   Date Time Provider Department Center   2025  9:45 AM [facility] GEN SURG PA [id number]      1.  Transitional Care Clinic Visit: Next visit Discharged  2.  PCP Visit: [facility] liaison to  contact patient to establish with PCP  3.  Chronic Care Management: N/A     NADINE Ford, 2025  [facility]  [phone]         [1] No Known Allergies  [2]   Past Medical History:   Diabetes (HCC)    DM (diabetes mellitus) (HCC)    Sciatica   [3] No past surgical history on file.  [4]   Family History  Problem Relation Age of Onset    Diabetes Father     Heart Disorder Mother     Diabetes Sister    [5]   Social History  Socioeconomic History    Marital status: Single   Tobacco Use    Smoking status: Every Day     Current packs/day: 0.50     Types: Cigarettes    Smokeless tobacco: Never   Substance and Sexual Activity    Alcohol use: Never     Social Drivers of Health     Food Insecurity: No Food Insecurity (2025)    NCSS - Food Insecurity     Worried About Running Out of Food in the Last Year: No     Ran Out of Food in the Last Year: No   Transportation Needs: No Transportation Needs (2025)    NCSS - Transportation     Lack of Transportation: No   Housing Stability: Not At Risk (2025)    NCSS - Housing/Utilities     Has Housing: Yes     Worried About Losing Housing: No     Unable to Get Utilities: No   [6]    Blood Glucose Monitoring Suppl (RELION PREMIER CLASSIC) Does not apply Device 1 each daily. 1 each 0    Glucose Blood (RELION PREMIER TEST) In Vitro Strip 1 each by Other route daily. 100 each 0    ReliOn Lancets Ultra-Thin 30G Does not apply Misc 1 each daily. 100 each 0    ReliOn Alcohol Swabs Does not apply Pads Apply 1 each topically daily. Clean area prior to testing blood glucose 100 each 0    ibuprofen 600 MG Oral Tab Take 1 tablet (600 mg total) by mouth every 6 (six) hours as needed for Pain. 30 tablet 0    oxyCODONE-acetaminophen 5-325 MG Oral Tab Take 1 tablet by mouth every 4 (four) hours as needed. 5 tablet 0    Ibuprofen (ADVIL) 200 MG Oral Cap Take 2 capsules (400 mg total) by mouth every 6 (six) hours as needed. 40 capsule 0    []  amoxicillin clavulanate 875-125 MG Oral Tab Take 1 tablet by mouth 2 (two) times daily for 1 day. 2 tablet 0    glipiZIDE 5 MG Oral Tab Take 1 tablet (5 mg total) by mouth every morning.      metFORMIN 500 MG Oral Tab Take 1 tablet (500 mg total) by mouth 2 (two) times daily.      valACYclovir 500 MG Oral Tab Take 1 tablet (500 mg total) by mouth daily.      pregabalin 100 MG Oral Cap Take 1 capsule (100 mg total) by mouth in the morning, at noon, and at bedtime.

## 2025-05-16 NOTE — PROGRESS NOTES
TRANSITIONAL CARE CLINIC PHARMACIST MEDICATION RECONCILIATION        Domi Cordova MRN [id number]    1960 PCP None Pcp       Comments: Medication history completed in Transitional Care Clinic by pharmacist with patient's daughter serving as  per patient request. Two discrepancies have been identified and corrected.     The following medication changes occurred while patient was hospitalized:  Medications Started:   Amoxicillin-clavulanate 875-125 mg BID x 1 day  Ibuprofen 400 mg Q6H PRN  Oxycodone-acetaminophen 5-325 mg Q4H PRN  Medications Stopped:   None  Medications Changed:   None      Outpatient Encounter Medications as of 2025   Medication Sig    oxyCODONE-acetaminophen 5-325 MG Oral Tab Take 1 tablet by mouth every 4 (four) hours as needed.    Ibuprofen (ADVIL) 200 MG Oral Cap Take 2 capsules (400 mg total) by mouth every 6 (six) hours as needed.    amoxicillin clavulanate 875-125 MG Oral Tab Take 1 tablet by mouth 2 (two) times daily for 1 day.    glipiZIDE 5 MG Oral Tab Take 1 tablet (5 mg total) by mouth every morning.    metFORMIN 500 MG Oral Tab Take 1 tablet (500 mg total) by mouth 2 (two) times daily.    valACYclovir 500 MG Oral Tab Take 1 tablet (500 mg total) by mouth daily.    pregabalin 100 MG Oral Cap Take 1 capsule (100 mg total) by mouth in the morning, at noon, and at bedtime.    [DISCONTINUED] azithromycin 250 MG Oral Tab Take 1 tablet (250 mg total) by mouth daily.    [DISCONTINUED] Insulin NPH & Regular 70/30 (70-30) 100 UNIT/ML Subcutaneous Suspension Inject 30 Units into the skin 2 (two) times daily before meals. (Patient not taking: Reported on 2025)     No facility-administered encounter medications on file as of 2025.           Medication Adherence Assessment:   Patient reports that she has taken 2 of the oxycodone-acetaminophen due to pain and has been primarily using ibuprofen for pain control. Patient complaining of abdominal pain that she  believes is a result of constipation. Patient reports not having a bowel movement for 7 days. Discussed options for treatment of constipation. She completed the antibiotic x2 tablets that she was given and then states that the pharmacy dispensed 2 additional tablets that she is taking. Patient reports not taking insulin or azithromycin. Medication list updated. Patient requires blood glucose monitoring supplies after discussion with NP; will pend orders.     Reviewed all medications in detail with patient including dose, indication, timing of administration, monitoring parameters, and potential side effects of medications.   Patient confirmed understanding.     Thank you,    Pamela Birch, PharmD, 5/16/2025, 9:39 AM  Transitional Care Clinic

## 2025-05-16 NOTE — PATIENT INSTRUCTIONS
[facility] will contact you to schedule a follow up appointment.  [phone]    Alternate pain medication with ibuprofen and Tylenol  8 AM-ibuprofen 600 mg with food  12 PM-Tylenol 1000 mg  4 PM-ibuprofen 600 mg with food  8 PM-Tylenol 1000 mg  May take oxycodone if pain is greater then 7/10 to decrease risk of constipation    For ongoing constipation:  Take MiraLAX 17 g in 8 ounces of fluid today and daily on Saturday, Sunday, and on Monday let [facility] office know how many bowel movements patient has had over the weekend  Continue MiraLAX daily until having a bowel movement 2 consecutive days in a row then may use as needed    Prescription sent for [employer] brand at [facility] for glucose monitoring supplies-please purchase and start checking BS daily and morning fasting    Recommended  Incentive spirometer 5-10 times per hour while awake-2-3 times every 15-20 minutes to prevent pneumonia  Up walking 5-10 minutes every 30-60 minutes while awake to prevent blood clots

## 2025-05-27 ENCOUNTER — OFFICE VISIT (OUTPATIENT)
Facility: LOCATION | Age: 65
End: 2025-05-27

## 2025-05-27 VITALS
SYSTOLIC BLOOD PRESSURE: 116 MMHG | TEMPERATURE: 98 F | DIASTOLIC BLOOD PRESSURE: 68 MMHG | OXYGEN SATURATION: 100 % | HEART RATE: 84 BPM

## 2025-05-27 DIAGNOSIS — Z09 POSTOP CHECK: Primary | ICD-10-CM

## 2025-05-27 NOTE — PROGRESS NOTES
Post Operative Visit Note       Active Problems  1. Postop check         Chief Complaint   Chief Complaint   Patient presents with    Post-Op     PO- 5/10 w/ Dr. Dhillon LAPAROSCOPIC APPENDECTOMY. Pt feels a painful lump around her lower left abdominal incision. Pt has been constipated, has been having some diarrhea. Pt denies any n/v, fevers, or chills.           History of Present Illness   The patient presents today for postoperative visit following laparoscopic appendectomy on 5/10/2025 with Dr. Dhillon.  Patient is accompanied by her daughter who assists in translating as needed.  Video  also offered.  Patient states she is overall been recovering well and without acute complaints today.  She reports mild incisional discomfort with movement, though is no longer taking any pain medicine.  She does note some swelling to her left lower quadrant incision.  She has been tolerating a diet without nausea or vomiting.  Patient reports constipation.  She is taking MiraLAX once a day.  She states she has been having 1 small and hard stool daily.  She is no longer taking narcotic.  She denies any abdominal pain.  No fevers or chills.    Allergies  Domi has no known allergies.    Past Medical / Surgical / Social / Family History    The past medical and past surgical history have been reviewed by me today.     Past Medical History:    Diabetes (HCC)    DM (diabetes mellitus) (HCC)    Sciatica     History reviewed. No pertinent surgical history.    The family history and social history have been reviewed by me today.    Family History   Problem Relation Age of Onset    Diabetes Father     Heart Disorder Mother     Diabetes Sister      Social History     Socioeconomic History    Marital status: Single   Tobacco Use    Smoking status: Every Day     Current packs/day: 0.50     Types: Cigarettes    Smokeless tobacco: Never   Substance and Sexual Activity    Alcohol use: Never        Current Outpatient Medications:      Blood Glucose Monitoring Suppl (RELION PREMIER CLASSIC) Does not apply Device, 1 each daily., Disp: 1 each, Rfl: 0    Glucose Blood (RELION PREMIER TEST) In Vitro Strip, 1 each by Other route daily., Disp: 100 each, Rfl: 0    ReliOn Lancets Ultra-Thin 30G Does not apply Misc, 1 each daily., Disp: 100 each, Rfl: 0    ReliOn Alcohol Swabs Does not apply Pads, Apply 1 each topically daily. Clean area prior to testing blood glucose, Disp: 100 each, Rfl: 0    ibuprofen 600 MG Oral Tab, Take 1 tablet (600 mg total) by mouth every 6 (six) hours as needed for Pain., Disp: 30 tablet, Rfl: 0    oxyCODONE-acetaminophen 5-325 MG Oral Tab, Take 1 tablet by mouth every 4 (four) hours as needed., Disp: 5 tablet, Rfl: 0    Ibuprofen (ADVIL) 200 MG Oral Cap, Take 2 capsules (400 mg total) by mouth every 6 (six) hours as needed., Disp: 40 capsule, Rfl: 0    glipiZIDE 5 MG Oral Tab, Take 1 tablet (5 mg total) by mouth every morning., Disp: , Rfl:     metFORMIN 500 MG Oral Tab, Take 1 tablet (500 mg total) by mouth 2 (two) times daily., Disp: , Rfl:     valACYclovir 500 MG Oral Tab, Take 1 tablet (500 mg total) by mouth daily., Disp: , Rfl:     pregabalin 100 MG Oral Cap, Take 1 capsule (100 mg total) by mouth in the morning, at noon, and at bedtime., Disp: , Rfl:       Review of Systems  A 10 point Review of Systems has been completed by me today and is negative except as above in the HPI.    Physical Findings   /68 (BP Location: Left arm, Patient Position: Sitting, Cuff Size: child)   Pulse 84   Temp 98.4 °F (36.9 °C) (Temporal)   SpO2 100%   Gen/psych: alert and oriented, cooperative, no apparent distress  Cardiovascular: regular rate  Respiratory: respirations unlabored, no wheeze  Abdominal: soft, non-tender, non-distended, no guarding/rebound  Incisions: Clean, dry, intact, no erythema, no drainage  Left lower quadrant incision with small amount of swelling, consistent with likely seroma versus hematoma.  No overlying  skin erythema or ecchymosis.  No defect to suggest hernia.         Assessment/Plan  1. Postop check      The patient presents today for postoperative visit following laparoscopic appendectomy on 5/10/2025 with Dr. Dhillon.      The patient is doing well postoperatively  Incisions are healing well without any signs of infection.  We did discuss that she likely has a small seroma to her left lower quadrant incision.  This should improve with time.  The patient is to continue with diet as tolerated.  We discussed incorporating fiber into her diet and staying hydrated.  Recommend MiraLAX as needed for constipation.  She may increase this to twice daily.  If she does not have results with MiraLAX, we recommended over-the-counter milk of magnesia or mag citrate.  The patient is to continue with lifting restrictions of no more than 15 pounds for 6 weeks from the date of the surgery.  Surgical pathology was discussed with the patient and consistent with acute appendicitis.  All questions and concerns were answered.  The patient is to return to the clinic as needed.           No orders of the defined types were placed in this encounter.       Imaging & Referrals   None    Follow Up  Return if symptoms worsen or fail to improve.    Maura Chavarria PA-C  [facility] General Surgery  5/27/2025  11:20 AM

## (undated) DEVICE — [HIGH FLOW INSUFFLATOR,  DO NOT USE IF PACKAGE IS DAMAGED,  KEEP DRY,  KEEP AWAY FROM SUNLIGHT,  PROTECT FROM HEAT AND RADIOACTIVE SOURCES.]: Brand: PNEUMOSURE

## (undated) DEVICE — SUT MCRYL 4-0 18IN PS-2 ABSRB UD 19MM 3/8 CIR

## (undated) DEVICE — ENDOPATH PNEUMONEEDLE INSUFFLATION NEEDLES WITH LUER LOCK CONNECTORS 120MM: Brand: ENDOPATH

## (undated) DEVICE — LAP CHOLE: Brand: MEDLINE INDUSTRIES, INC.

## (undated) DEVICE — SOLUTION IV 1000ML 0.9% NACL PRESERVATIVE

## (undated) DEVICE — TROCAR: Brand: KII FIOS FIRST ENTRY

## (undated) DEVICE — SUT COAT VCRL + 0 54IN ABSRB UD ANTIBACT

## (undated) DEVICE — MARYLAND JAW LAPAROSCOPIC SEALER/DIVIDER COATED: Brand: LIGASURE

## (undated) DEVICE — TISSUE RETRIEVAL SYSTEM: Brand: INZII RETRIEVAL SYSTEM

## (undated) DEVICE — GLOVE SUR 6 SENSICARE PI MIC PIP CRM PWD F

## (undated) DEVICE — GLOVE SUR 6.5 SENSICARE PI PIP CRM PWD F

## (undated) DEVICE — TROCAR: Brand: KII® SLEEVE

## (undated) DEVICE — Device: Brand: SUTURE PASSOR PRO

## (undated) DEVICE — SOLUTION IRRIG 1000ML 0.9% NACL USP BTL

## (undated) DEVICE — ARTICULATION RELOAD WITH TRI-STAPLE TECHNOLOGY: Brand: ENDO GIA

## (undated) DEVICE — Device: Brand: JELCO

## (undated) DEVICE — POWER SHELL: Brand: SIGNIA

## (undated) DEVICE — GLOVE SUR 7 SENSICARE PI MIC PIP CRM PWD F